# Patient Record
Sex: MALE | Race: WHITE | Employment: FULL TIME | URBAN - METROPOLITAN AREA
[De-identification: names, ages, dates, MRNs, and addresses within clinical notes are randomized per-mention and may not be internally consistent; named-entity substitution may affect disease eponyms.]

---

## 2019-08-23 RX ORDER — LOSARTAN POTASSIUM 25 MG/1
TABLET ORAL
Refills: 8 | COMMUNITY
Start: 2019-07-30 | End: 2019-08-23 | Stop reason: SDUPTHER

## 2019-08-26 RX ORDER — LOSARTAN POTASSIUM 25 MG/1
TABLET ORAL
Qty: 90 TABLET | Refills: 3 | Status: SHIPPED | OUTPATIENT
Start: 2019-08-26 | End: 2019-08-31 | Stop reason: SDUPTHER

## 2019-08-31 ENCOUNTER — OFFICE VISIT (OUTPATIENT)
Dept: PRIMARY CARE CLINIC | Age: 49
End: 2019-08-31
Payer: COMMERCIAL

## 2019-08-31 VITALS
BODY MASS INDEX: 33.18 KG/M2 | TEMPERATURE: 98.9 F | WEIGHT: 237 LBS | SYSTOLIC BLOOD PRESSURE: 128 MMHG | HEIGHT: 71 IN | DIASTOLIC BLOOD PRESSURE: 82 MMHG

## 2019-08-31 DIAGNOSIS — Z12.5 PROSTATE CANCER SCREENING: ICD-10-CM

## 2019-08-31 DIAGNOSIS — Z00.01 ENCOUNTER FOR ANNUAL GENERAL MEDICAL EXAMINATION WITH ABNORMAL FINDINGS IN ADULT: Primary | ICD-10-CM

## 2019-08-31 DIAGNOSIS — I10 ESSENTIAL HYPERTENSION: ICD-10-CM

## 2019-08-31 PROCEDURE — 99396 PREV VISIT EST AGE 40-64: CPT | Performed by: FAMILY MEDICINE

## 2019-08-31 RX ORDER — LOSARTAN POTASSIUM 25 MG/1
TABLET ORAL
Qty: 90 TABLET | Refills: 3 | Status: SHIPPED
Start: 2019-08-31 | End: 2020-08-07 | Stop reason: SDUPTHER

## 2019-08-31 ASSESSMENT — ENCOUNTER SYMPTOMS
RESPIRATORY NEGATIVE: 1
ALLERGIC/IMMUNOLOGIC NEGATIVE: 1
GASTROINTESTINAL NEGATIVE: 1
EYES NEGATIVE: 1

## 2020-07-24 ENCOUNTER — HOSPITAL ENCOUNTER (OUTPATIENT)
Age: 50
Discharge: HOME | End: 2020-07-26
Payer: COMMERCIAL

## 2020-07-24 LAB
ALBUMIN SERPL-MCNC: 4.5 G/DL (ref 3.5–5.2)
ALP BLD-CCNC: 81 U/L (ref 40–129)
ALT SERPL-CCNC: 19 U/L (ref 0–40)
ANION GAP SERPL CALCULATED.3IONS-SCNC: 12 MMOL/L (ref 7–16)
AST SERPL-CCNC: 18 U/L (ref 0–39)
BASOPHILS ABSOLUTE: 0.02 E9/L (ref 0–0.2)
BASOPHILS RELATIVE PERCENT: 0.4 % (ref 0–2)
BILIRUB SERPL-MCNC: 0.7 MG/DL (ref 0–1.2)
BUN BLDV-MCNC: 14 MG/DL (ref 6–20)
CALCIUM SERPL-MCNC: 9.2 MG/DL (ref 8.6–10.2)
CHLORIDE BLD-SCNC: 102 MMOL/L (ref 98–107)
CHOLESTEROL, TOTAL: 204 MG/DL (ref 0–199)
CO2: 26 MMOL/L (ref 22–29)
CREAT SERPL-MCNC: 1.2 MG/DL (ref 0.7–1.2)
EOSINOPHILS ABSOLUTE: 0 E9/L (ref 0.05–0.5)
EOSINOPHILS RELATIVE PERCENT: 0 % (ref 0–6)
GFR AFRICAN AMERICAN: >60
GFR NON-AFRICAN AMERICAN: >60 ML/MIN/1.73
GLUCOSE BLD-MCNC: 106 MG/DL (ref 74–99)
HCT VFR BLD CALC: 42.6 % (ref 37–54)
HDLC SERPL-MCNC: 39 MG/DL
HEMOGLOBIN: 14.4 G/DL (ref 12.5–16.5)
IMMATURE GRANULOCYTES #: 0.01 E9/L
IMMATURE GRANULOCYTES %: 0.2 % (ref 0–5)
LDL CHOLESTEROL CALCULATED: 143 MG/DL (ref 0–99)
LYMPHOCYTES ABSOLUTE: 1.79 E9/L (ref 1.5–4)
LYMPHOCYTES RELATIVE PERCENT: 35 % (ref 20–42)
MCH RBC QN AUTO: 30.8 PG (ref 26–35)
MCHC RBC AUTO-ENTMCNC: 33.8 % (ref 32–34.5)
MCV RBC AUTO: 91.2 FL (ref 80–99.9)
MONOCYTES ABSOLUTE: 0.53 E9/L (ref 0.1–0.95)
MONOCYTES RELATIVE PERCENT: 10.4 % (ref 2–12)
NEUTROPHILS ABSOLUTE: 2.77 E9/L (ref 1.8–7.3)
NEUTROPHILS RELATIVE PERCENT: 54 % (ref 43–80)
PDW BLD-RTO: 12.9 FL (ref 11.5–15)
PLATELET # BLD: 192 E9/L (ref 130–450)
PMV BLD AUTO: 9.9 FL (ref 7–12)
POTASSIUM SERPL-SCNC: 3.9 MMOL/L (ref 3.5–5)
PROSTATE SPECIFIC ANTIGEN: 1.45 NG/ML (ref 0–4)
RBC # BLD: 4.67 E12/L (ref 3.8–5.8)
SODIUM BLD-SCNC: 140 MMOL/L (ref 132–146)
TOTAL PROTEIN: 6.7 G/DL (ref 6.4–8.3)
TRIGL SERPL-MCNC: 108 MG/DL (ref 0–149)
VLDLC SERPL CALC-MCNC: 22 MG/DL
WBC # BLD: 5.1 E9/L (ref 4.5–11.5)

## 2020-07-24 PROCEDURE — 36415 COLL VENOUS BLD VENIPUNCTURE: CPT

## 2020-07-24 PROCEDURE — G0103 PSA SCREENING: HCPCS

## 2020-07-24 PROCEDURE — 85025 COMPLETE CBC W/AUTO DIFF WBC: CPT

## 2020-07-24 PROCEDURE — 80061 LIPID PANEL: CPT

## 2020-07-24 PROCEDURE — 80053 COMPREHEN METABOLIC PANEL: CPT

## 2020-08-07 ENCOUNTER — OFFICE VISIT (OUTPATIENT)
Dept: PRIMARY CARE CLINIC | Age: 50
End: 2020-08-07
Payer: COMMERCIAL

## 2020-08-07 VITALS
TEMPERATURE: 97.7 F | SYSTOLIC BLOOD PRESSURE: 128 MMHG | BODY MASS INDEX: 34.52 KG/M2 | DIASTOLIC BLOOD PRESSURE: 82 MMHG | WEIGHT: 244 LBS

## 2020-08-07 PROBLEM — I10 ESSENTIAL HYPERTENSION: Status: ACTIVE | Noted: 2020-08-07

## 2020-08-07 PROBLEM — I10 ESSENTIAL HYPERTENSION: Chronic | Status: ACTIVE | Noted: 2020-08-07

## 2020-08-07 PROBLEM — E78.2 MIXED HYPERLIPIDEMIA: Chronic | Status: ACTIVE | Noted: 2020-08-07

## 2020-08-07 PROBLEM — E78.2 MIXED HYPERLIPIDEMIA: Status: ACTIVE | Noted: 2020-08-07

## 2020-08-07 PROCEDURE — 99396 PREV VISIT EST AGE 40-64: CPT | Performed by: FAMILY MEDICINE

## 2020-08-07 RX ORDER — LOSARTAN POTASSIUM 25 MG/1
TABLET ORAL
Qty: 90 TABLET | Refills: 3 | Status: SHIPPED
Start: 2020-08-07 | End: 2020-09-09 | Stop reason: SDUPTHER

## 2020-08-07 ASSESSMENT — ENCOUNTER SYMPTOMS
GASTROINTESTINAL NEGATIVE: 1
RESPIRATORY NEGATIVE: 1
EYES NEGATIVE: 1
ALLERGIC/IMMUNOLOGIC NEGATIVE: 1

## 2020-08-07 ASSESSMENT — PATIENT HEALTH QUESTIONNAIRE - PHQ9
1. LITTLE INTEREST OR PLEASURE IN DOING THINGS: 0
2. FEELING DOWN, DEPRESSED OR HOPELESS: 0
SUM OF ALL RESPONSES TO PHQ9 QUESTIONS 1 & 2: 0
SUM OF ALL RESPONSES TO PHQ QUESTIONS 1-9: 0
SUM OF ALL RESPONSES TO PHQ QUESTIONS 1-9: 0

## 2020-08-07 NOTE — PROGRESS NOTES
20  Name: Patsy Travis    : 1970    Sex: male    Age: 47 y.o. Subjective:  Chief Complaint: Patient is here for yearly wellness  Visit and bp and alb     Feel ok   No cp or sob  Lab ntoed     Ros neg  Feels  Ok   No sx  Wt up7  Lbs  Chol   207---204    Ps   1-1-------      Review of Systems   Constitutional: Negative. HENT: Negative. Eyes: Negative. Respiratory: Negative. Cardiovascular: Negative. Gastrointestinal: Negative. Endocrine: Negative. Genitourinary: Negative. Musculoskeletal: Negative. Skin: Negative. Allergic/Immunologic: Negative. Neurological: Negative. Hematological: Negative. Psychiatric/Behavioral: Negative.           Current Outpatient Medications:     losartan (COZAAR) 25 MG tablet, TK 1 T PO QAM FOR BP, Disp: 90 tablet, Rfl: 3  Allergies   Allergen Reactions    Pcn [Penicillins] Anaphylaxis    Sulfa Antibiotics Anaphylaxis     Social History     Socioeconomic History    Marital status:      Spouse name: Not on file    Number of children: Not on file    Years of education: Not on file    Highest education level: Not on file   Occupational History    Not on file   Social Needs    Financial resource strain: Not on file    Food insecurity     Worry: Not on file     Inability: Not on file    Transportation needs     Medical: Not on file     Non-medical: Not on file   Tobacco Use    Smoking status: Never Smoker    Smokeless tobacco: Never Used   Substance and Sexual Activity    Alcohol use: No    Drug use: No    Sexual activity: Not on file   Lifestyle    Physical activity     Days per week: Not on file     Minutes per session: Not on file    Stress: Not on file   Relationships    Social connections     Talks on phone: Not on file     Gets together: Not on file     Attends Jew service: Not on file     Active member of club or organization: Not on file     Attends meetings of clubs or organizations: Not on file     Relationship status: Not on file    Intimate partner violence     Fear of current or ex partner: Not on file     Emotionally abused: Not on file     Physically abused: Not on file     Forced sexual activity: Not on file   Other Topics Concern    Not on file   Social History Narrative        SERUM SICKNESS DUE TO SULFA ALLERGIC REACTION---2004    THROMBOCYTOPENIA    ELEV BS    URETERAL CALCULI    L THUMB Jayce NO--2---THIS 11 YRS--ONE DAUGHTER WITH HER    ONE DAUGHTER LIVES IN [place] WITH PATIENTS FIRST WIFE    One daughter lives here    ED    WIFE LPN NURSING HOME---THEN HHC---peds  [employer]    HTN 7-13    ANX WITH INTERCOURSE    WORKS FOR [employer]---    LEFT URETERAL CALCULI 9-17 OR DR MY RUCKER---TOLD TO DRINK LEMONAIDE ADN    INC FLUIDS--TO SEE UROL 0CT 2020    IRBBB 9-17    BILAT ING AND UM HERNIA SEEN ON CT 9-17    . Past Medical History:   Diagnosis Date    Anxiety     Erectile dysfunction     Hyperlipidemia     Hypertension     IFG (impaired fasting glucose)     Incomplete right bundle branch block     Inguinal hernia     Serum sickness     Thrombocytopenia (HCC)     Torn tendon     LEFT THUMB    Umbilical hernia     Ureteral calculi      No family history on file. Past Surgical History:   Procedure Laterality Date    LITHOTRIPSY Left 09/28/2017    CYSTOSCOPY;LEFT STENT    TONSILLECTOMY        Vitals:    08/07/20 0702   BP: 128/82   Temp: 97.7 °F (36.5 °C)   Weight: 244 lb (110.7 kg)       Objective:    Physical Exam  Vitals signs reviewed. Constitutional:       Appearance: He is well-developed. HENT:      Head: Normocephalic. Eyes:      Pupils: Pupils are equal, round, and reactive to light. Neck:      Musculoskeletal: Normal range of motion. Cardiovascular:      Rate and Rhythm: Normal rate and regular rhythm. Pulmonary:      Effort: Pulmonary effort is normal.      Breath sounds: Normal breath sounds.

## 2020-09-09 RX ORDER — LOSARTAN POTASSIUM 25 MG/1
TABLET ORAL
Qty: 90 TABLET | Refills: 3 | Status: SHIPPED
Start: 2020-09-09 | End: 2021-07-26 | Stop reason: SDUPTHER

## 2021-03-26 ENCOUNTER — IMMUNIZATION (OUTPATIENT)
Dept: PRIMARY CARE CLINIC | Age: 51
End: 2021-03-26
Payer: COMMERCIAL

## 2021-03-26 PROCEDURE — 91301 COVID-19, MODERNA VACCINE 100MCG/0.5ML DOSE: CPT | Performed by: PHYSICIAN ASSISTANT

## 2021-03-26 PROCEDURE — 0011A PR IMM ADMN SARSCOV2 100 MCG/0.5 ML 1ST DOSE: CPT | Performed by: PHYSICIAN ASSISTANT

## 2021-04-20 ENCOUNTER — PREP FOR PROCEDURE (OUTPATIENT)
Dept: UROLOGY | Age: 51
End: 2021-04-20

## 2021-04-20 DIAGNOSIS — N20.0 RENAL STONE: Primary | ICD-10-CM

## 2021-04-20 RX ORDER — SODIUM CHLORIDE 9 MG/ML
INJECTION, SOLUTION INTRAVENOUS CONTINUOUS
Status: CANCELLED | OUTPATIENT
Start: 2021-04-20

## 2021-04-20 RX ORDER — SODIUM CHLORIDE 0.9 % (FLUSH) 0.9 %
5-40 SYRINGE (ML) INJECTION EVERY 12 HOURS SCHEDULED
Status: CANCELLED | OUTPATIENT
Start: 2021-04-20

## 2021-04-20 RX ORDER — CIPROFLOXACIN 2 MG/ML
400 INJECTION, SOLUTION INTRAVENOUS
Status: CANCELLED | OUTPATIENT
Start: 2021-04-20 | End: 2021-04-20

## 2021-04-20 RX ORDER — SODIUM CHLORIDE 0.9 % (FLUSH) 0.9 %
5-40 SYRINGE (ML) INJECTION PRN
Status: CANCELLED | OUTPATIENT
Start: 2021-04-20

## 2021-04-20 RX ORDER — SODIUM CHLORIDE 9 MG/ML
25 INJECTION, SOLUTION INTRAVENOUS PRN
Status: CANCELLED | OUTPATIENT
Start: 2021-04-20

## 2021-04-21 ENCOUNTER — HOSPITAL ENCOUNTER (OUTPATIENT)
Age: 51
Discharge: HOME | End: 2021-04-23
Payer: COMMERCIAL

## 2021-04-21 DIAGNOSIS — Z01.818 PREOP TESTING: ICD-10-CM

## 2021-04-21 PROCEDURE — U0003 INFECTIOUS AGENT DETECTION BY NUCLEIC ACID (DNA OR RNA); SEVERE ACUTE RESPIRATORY SYNDROME CORONAVIRUS 2 (SARS-COV-2) (CORONAVIRUS DISEASE [COVID-19]), AMPLIFIED PROBE TECHNIQUE, MAKING USE OF HIGH THROUGHPUT TECHNOLOGIES AS DESCRIBED BY CMS-2020-01-R: HCPCS

## 2021-04-22 LAB
SARS-COV-2: NOT DETECTED
SOURCE: NORMAL

## 2021-04-23 ENCOUNTER — IMMUNIZATION (OUTPATIENT)
Dept: PRIMARY CARE CLINIC | Age: 51
End: 2021-04-23
Payer: COMMERCIAL

## 2021-04-23 ENCOUNTER — HOSPITAL ENCOUNTER (OUTPATIENT)
Dept: PREADMISSION TESTING | Age: 51
Discharge: HOME | End: 2021-04-23
Payer: COMMERCIAL

## 2021-04-23 DIAGNOSIS — N20.0 RENAL STONE: ICD-10-CM

## 2021-04-23 LAB
ANION GAP SERPL CALCULATED.3IONS-SCNC: 8 MMOL/L (ref 7–16)
BUN BLDV-MCNC: 14 MG/DL (ref 6–20)
CALCIUM SERPL-MCNC: 9.2 MG/DL (ref 8.6–10.2)
CHLORIDE BLD-SCNC: 103 MMOL/L (ref 98–107)
CO2: 29 MMOL/L (ref 22–29)
CREAT SERPL-MCNC: 1.1 MG/DL (ref 0.7–1.2)
EKG ATRIAL RATE: 69 BPM
EKG P AXIS: 7 DEGREES
EKG P-R INTERVAL: 130 MS
EKG Q-T INTERVAL: 404 MS
EKG QRS DURATION: 108 MS
EKG QTC CALCULATION (BAZETT): 432 MS
EKG R AXIS: 47 DEGREES
EKG T AXIS: 24 DEGREES
EKG VENTRICULAR RATE: 69 BPM
GFR AFRICAN AMERICAN: >60
GFR NON-AFRICAN AMERICAN: >60 ML/MIN/1.73
GLUCOSE BLD-MCNC: 108 MG/DL (ref 74–99)
HCT VFR BLD CALC: 42.1 % (ref 37–54)
HEMOGLOBIN: 14 G/DL (ref 12.5–16.5)
MCH RBC QN AUTO: 30.1 PG (ref 26–35)
MCHC RBC AUTO-ENTMCNC: 33.3 % (ref 32–34.5)
MCV RBC AUTO: 90.5 FL (ref 80–99.9)
PDW BLD-RTO: 12.3 FL (ref 11.5–15)
PLATELET # BLD: 209 E9/L (ref 130–450)
PMV BLD AUTO: 9.3 FL (ref 7–12)
POTASSIUM SERPL-SCNC: 4 MMOL/L (ref 3.5–5)
RBC # BLD: 4.65 E12/L (ref 3.8–5.8)
SODIUM BLD-SCNC: 140 MMOL/L (ref 132–146)
WBC # BLD: 5.3 E9/L (ref 4.5–11.5)

## 2021-04-23 PROCEDURE — 85027 COMPLETE CBC AUTOMATED: CPT

## 2021-04-23 PROCEDURE — 80048 BASIC METABOLIC PNL TOTAL CA: CPT

## 2021-04-23 PROCEDURE — 93005 ELECTROCARDIOGRAM TRACING: CPT | Performed by: NURSE PRACTITIONER

## 2021-04-23 PROCEDURE — 36415 COLL VENOUS BLD VENIPUNCTURE: CPT

## 2021-04-23 PROCEDURE — 91301 COVID-19, MODERNA VACCINE 100MCG/0.5ML DOSE: CPT | Performed by: PHYSICIAN ASSISTANT

## 2021-04-23 PROCEDURE — 0012A COVID-19, MODERNA VACCINE 100MCG/0.5ML DOSE: CPT | Performed by: PHYSICIAN ASSISTANT

## 2021-04-26 ENCOUNTER — HOSPITAL ENCOUNTER (OUTPATIENT)
Age: 51
Setting detail: OUTPATIENT SURGERY
Discharge: HOME | End: 2021-04-26
Attending: UROLOGY | Admitting: UROLOGY
Payer: COMMERCIAL

## 2021-04-26 ENCOUNTER — ANESTHESIA EVENT (OUTPATIENT)
Dept: OPERATING ROOM | Age: 51
End: 2021-04-26
Payer: COMMERCIAL

## 2021-04-26 ENCOUNTER — ANESTHESIA (OUTPATIENT)
Dept: OPERATING ROOM | Age: 51
End: 2021-04-26
Payer: COMMERCIAL

## 2021-04-26 VITALS
HEART RATE: 81 BPM | SYSTOLIC BLOOD PRESSURE: 151 MMHG | OXYGEN SATURATION: 97 % | RESPIRATION RATE: 20 BRPM | WEIGHT: 230 LBS | DIASTOLIC BLOOD PRESSURE: 91 MMHG | HEIGHT: 70 IN | TEMPERATURE: 96.5 F | BODY MASS INDEX: 32.93 KG/M2

## 2021-04-26 VITALS — OXYGEN SATURATION: 100 % | SYSTOLIC BLOOD PRESSURE: 93 MMHG | DIASTOLIC BLOOD PRESSURE: 50 MMHG | TEMPERATURE: 96.4 F

## 2021-04-26 DIAGNOSIS — G89.18 POST-OPERATIVE PAIN: ICD-10-CM

## 2021-04-26 DIAGNOSIS — Z01.818 PREOP TESTING: Primary | ICD-10-CM

## 2021-04-26 PROCEDURE — 7100000010 HC PHASE II RECOVERY - FIRST 15 MIN: Performed by: UROLOGY

## 2021-04-26 PROCEDURE — 3600000003 HC SURGERY LEVEL 3 BASE: Performed by: UROLOGY

## 2021-04-26 PROCEDURE — 6360000002 HC RX W HCPCS: Performed by: NURSE PRACTITIONER

## 2021-04-26 PROCEDURE — 3600000013 HC SURGERY LEVEL 3 ADDTL 15MIN: Performed by: UROLOGY

## 2021-04-26 PROCEDURE — C1758 CATHETER, URETERAL: HCPCS | Performed by: UROLOGY

## 2021-04-26 PROCEDURE — 7100000011 HC PHASE II RECOVERY - ADDTL 15 MIN: Performed by: UROLOGY

## 2021-04-26 PROCEDURE — 6360000002 HC RX W HCPCS: Performed by: NURSE ANESTHETIST, CERTIFIED REGISTERED

## 2021-04-26 PROCEDURE — 6360000004 HC RX CONTRAST MEDICATION: Performed by: UROLOGY

## 2021-04-26 PROCEDURE — 2709999900 HC NON-CHARGEABLE SUPPLY: Performed by: UROLOGY

## 2021-04-26 PROCEDURE — 2580000003 HC RX 258: Performed by: NURSE PRACTITIONER

## 2021-04-26 PROCEDURE — 3700000000 HC ANESTHESIA ATTENDED CARE: Performed by: UROLOGY

## 2021-04-26 PROCEDURE — 2500000003 HC RX 250 WO HCPCS: Performed by: NURSE ANESTHETIST, CERTIFIED REGISTERED

## 2021-04-26 PROCEDURE — 6370000000 HC RX 637 (ALT 250 FOR IP): Performed by: ANESTHESIOLOGY

## 2021-04-26 PROCEDURE — 7100000001 HC PACU RECOVERY - ADDTL 15 MIN: Performed by: UROLOGY

## 2021-04-26 PROCEDURE — 7100000000 HC PACU RECOVERY - FIRST 15 MIN: Performed by: UROLOGY

## 2021-04-26 PROCEDURE — C2617 STENT, NON-COR, TEM W/O DEL: HCPCS | Performed by: UROLOGY

## 2021-04-26 PROCEDURE — C1769 GUIDE WIRE: HCPCS | Performed by: UROLOGY

## 2021-04-26 PROCEDURE — 3700000001 HC ADD 15 MINUTES (ANESTHESIA): Performed by: UROLOGY

## 2021-04-26 DEVICE — URETERAL STENT
Type: IMPLANTABLE DEVICE | Site: URETER | Status: FUNCTIONAL
Brand: PERCUFLEX™

## 2021-04-26 RX ORDER — ONDANSETRON 2 MG/ML
INJECTION INTRAMUSCULAR; INTRAVENOUS PRN
Status: DISCONTINUED | OUTPATIENT
Start: 2021-04-26 | End: 2021-04-26 | Stop reason: SDUPTHER

## 2021-04-26 RX ORDER — MIDAZOLAM HYDROCHLORIDE 1 MG/ML
INJECTION INTRAMUSCULAR; INTRAVENOUS PRN
Status: DISCONTINUED | OUTPATIENT
Start: 2021-04-26 | End: 2021-04-26 | Stop reason: SDUPTHER

## 2021-04-26 RX ORDER — ACETAMINOPHEN AND CODEINE PHOSPHATE 300; 30 MG/1; MG/1
1-2 TABLET ORAL EVERY 6 HOURS PRN
Qty: 10 TABLET | Refills: 0 | Status: SHIPPED | OUTPATIENT
Start: 2021-04-26 | End: 2021-05-03

## 2021-04-26 RX ORDER — PROMETHAZINE HYDROCHLORIDE 25 MG/ML
6.25 INJECTION, SOLUTION INTRAMUSCULAR; INTRAVENOUS
Status: DISCONTINUED | OUTPATIENT
Start: 2021-04-26 | End: 2021-04-26 | Stop reason: HOSPADM

## 2021-04-26 RX ORDER — PROPOFOL 10 MG/ML
INJECTION, EMULSION INTRAVENOUS PRN
Status: DISCONTINUED | OUTPATIENT
Start: 2021-04-26 | End: 2021-04-26 | Stop reason: SDUPTHER

## 2021-04-26 RX ORDER — LIDOCAINE HYDROCHLORIDE 20 MG/ML
INJECTION, SOLUTION EPIDURAL; INFILTRATION; INTRACAUDAL; PERINEURAL PRN
Status: DISCONTINUED | OUTPATIENT
Start: 2021-04-26 | End: 2021-04-26

## 2021-04-26 RX ORDER — FENTANYL CITRATE 50 UG/ML
INJECTION, SOLUTION INTRAMUSCULAR; INTRAVENOUS PRN
Status: DISCONTINUED | OUTPATIENT
Start: 2021-04-26 | End: 2021-04-26

## 2021-04-26 RX ORDER — SODIUM CHLORIDE 9 MG/ML
INJECTION, SOLUTION INTRAVENOUS CONTINUOUS
Status: DISCONTINUED | OUTPATIENT
Start: 2021-04-26 | End: 2021-04-26 | Stop reason: HOSPADM

## 2021-04-26 RX ORDER — DEXAMETHASONE SODIUM PHOSPHATE 4 MG/ML
INJECTION, SOLUTION INTRA-ARTICULAR; INTRALESIONAL; INTRAMUSCULAR; INTRAVENOUS; SOFT TISSUE PRN
Status: DISCONTINUED | OUTPATIENT
Start: 2021-04-26 | End: 2021-04-26 | Stop reason: SDUPTHER

## 2021-04-26 RX ORDER — CIPROFLOXACIN 2 MG/ML
400 INJECTION, SOLUTION INTRAVENOUS
Status: COMPLETED | OUTPATIENT
Start: 2021-04-26 | End: 2021-04-26

## 2021-04-26 RX ORDER — SODIUM CHLORIDE 0.9 % (FLUSH) 0.9 %
5-40 SYRINGE (ML) INJECTION PRN
Status: DISCONTINUED | OUTPATIENT
Start: 2021-04-26 | End: 2021-04-26 | Stop reason: HOSPADM

## 2021-04-26 RX ORDER — PROPOFOL 10 MG/ML
INJECTION, EMULSION INTRAVENOUS PRN
Status: DISCONTINUED | OUTPATIENT
Start: 2021-04-26 | End: 2021-04-26

## 2021-04-26 RX ORDER — LIDOCAINE HYDROCHLORIDE 20 MG/ML
INJECTION, SOLUTION EPIDURAL; INFILTRATION; INTRACAUDAL; PERINEURAL PRN
Status: DISCONTINUED | OUTPATIENT
Start: 2021-04-26 | End: 2021-04-26 | Stop reason: SDUPTHER

## 2021-04-26 RX ORDER — SODIUM CHLORIDE 9 MG/ML
25 INJECTION, SOLUTION INTRAVENOUS PRN
Status: DISCONTINUED | OUTPATIENT
Start: 2021-04-26 | End: 2021-04-26 | Stop reason: HOSPADM

## 2021-04-26 RX ORDER — HYDROCODONE BITARTRATE AND ACETAMINOPHEN 5; 325 MG/1; MG/1
1 TABLET ORAL ONCE
Status: COMPLETED | OUTPATIENT
Start: 2021-04-26 | End: 2021-04-26

## 2021-04-26 RX ORDER — SODIUM CHLORIDE 0.9 % (FLUSH) 0.9 %
5-40 SYRINGE (ML) INJECTION EVERY 12 HOURS SCHEDULED
Status: DISCONTINUED | OUTPATIENT
Start: 2021-04-26 | End: 2021-04-26 | Stop reason: HOSPADM

## 2021-04-26 RX ORDER — DEXAMETHASONE SODIUM PHOSPHATE 4 MG/ML
INJECTION, SOLUTION INTRA-ARTICULAR; INTRALESIONAL; INTRAMUSCULAR; INTRAVENOUS; SOFT TISSUE PRN
Status: DISCONTINUED | OUTPATIENT
Start: 2021-04-26 | End: 2021-04-26

## 2021-04-26 RX ORDER — FENTANYL CITRATE 50 UG/ML
INJECTION, SOLUTION INTRAMUSCULAR; INTRAVENOUS PRN
Status: DISCONTINUED | OUTPATIENT
Start: 2021-04-26 | End: 2021-04-26 | Stop reason: SDUPTHER

## 2021-04-26 RX ADMIN — SODIUM CHLORIDE: 9 INJECTION, SOLUTION INTRAVENOUS at 08:32

## 2021-04-26 RX ADMIN — FENTANYL CITRATE 50 MCG: 50 INJECTION, SOLUTION INTRAMUSCULAR; INTRAVENOUS at 08:43

## 2021-04-26 RX ADMIN — FENTANYL CITRATE 50 MCG: 50 INJECTION, SOLUTION INTRAMUSCULAR; INTRAVENOUS at 08:47

## 2021-04-26 RX ADMIN — ONDANSETRON 4 MG: 2 INJECTION INTRAMUSCULAR; INTRAVENOUS at 08:45

## 2021-04-26 RX ADMIN — PROPOFOL 200 MG: 10 INJECTION, EMULSION INTRAVENOUS at 08:43

## 2021-04-26 RX ADMIN — DEXAMETHASONE SODIUM PHOSPHATE 4 MG: 4 INJECTION, SOLUTION INTRAMUSCULAR; INTRAVENOUS at 08:46

## 2021-04-26 RX ADMIN — LIDOCAINE HYDROCHLORIDE 100 MG: 20 INJECTION, SOLUTION EPIDURAL; INFILTRATION; INTRACAUDAL; PERINEURAL at 08:43

## 2021-04-26 RX ADMIN — HYDROCODONE BITARTRATE AND ACETAMINOPHEN 1 TABLET: 5; 325 TABLET ORAL at 10:37

## 2021-04-26 RX ADMIN — CIPROFLOXACIN 400 MG: 2 INJECTION, SOLUTION INTRAVENOUS at 07:10

## 2021-04-26 RX ADMIN — MIDAZOLAM 2 MG: 1 INJECTION INTRAMUSCULAR; INTRAVENOUS at 08:33

## 2021-04-26 ASSESSMENT — PAIN SCALES - GENERAL
PAINLEVEL_OUTOF10: 7
PAINLEVEL_OUTOF10: 0

## 2021-04-26 ASSESSMENT — PULMONARY FUNCTION TESTS
PIF_VALUE: 4
PIF_VALUE: 1
PIF_VALUE: 4
PIF_VALUE: 4
PIF_VALUE: 5
PIF_VALUE: 1
PIF_VALUE: 4
PIF_VALUE: 4
PIF_VALUE: 5
PIF_VALUE: 4
PIF_VALUE: 3
PIF_VALUE: 35
PIF_VALUE: 4
PIF_VALUE: 6
PIF_VALUE: 4
PIF_VALUE: 17
PIF_VALUE: 5
PIF_VALUE: 4
PIF_VALUE: 3
PIF_VALUE: 2
PIF_VALUE: 4
PIF_VALUE: 3
PIF_VALUE: 2
PIF_VALUE: 4
PIF_VALUE: 4

## 2021-04-26 NOTE — ANESTHESIA PRE PROCEDURE
Department of Anesthesiology  Preprocedure Note       Name:  Nena Mccarty   Age:  45 y.o.  :  1970                                          MRN:  [id number]         Date:  2021      Surgeon: Babak Fregoso):  Garland Aleman MD    Procedure: Procedure(s):  CYSTOSCOPY LEFT ESWL EXTRACORPOREAL SHOCK WAVE LITHOTRIPSY LEFT STENT    Medications prior to admission:   Prior to Admission medications    Medication Sig Start Date End Date Taking? Authorizing Provider   losartan (COZAAR) 25 MG tablet TK 1 T PO QAM FOR BP 20  Yes Ajay Valdovinos DO       Current medications:    Current Facility-Administered Medications   Medication Dose Route Frequency Provider Last Rate Last Admin    0.9 % sodium chloride infusion   Intravenous Continuous TRINO Morales CNP        0.9 % sodium chloride infusion  25 mL Intravenous PRN TRINO Morales CNP        ciprofloxacin (CIPRO) IVPB 400 mg  400 mg Intravenous On Call to [address] - CNP        sodium chloride flush 0.9 % injection 5-40 mL  5-40 mL Intravenous 2 times per day TRINO Morales - CNP        sodium chloride flush 0.9 % injection 5-40 mL  5-40 mL Intravenous PRN TRINO Morales CNP           Allergies:     Allergies   Allergen Reactions    Pcn [Penicillins] Anaphylaxis    Sulfa Antibiotics Anaphylaxis       Problem List:    Patient Active Problem List   Diagnosis Code    Essential hypertension I10    Mixed hyperlipidemia E78.2       Past Medical History:        Diagnosis Date    Erectile dysfunction     Hyperlipidemia     diet controlled    Hypertension     Incomplete right bundle branch block     Inguinal hernia     Torn tendon     LEFT THUMB    Umbilical hernia     Ureteral calculi     For OR 21       Past Surgical History:        Procedure Laterality Date    LITHOTRIPSY Left 2017    CYSTOSCOPY;LEFT STENT    TONSILLECTOMY         Social History:    Social History     Tobacco Use    Smoking status: Never Smoker    Smokeless tobacco: Never Used   Substance Use Topics    Alcohol use: Yes     Comment: rare                                Counseling given: Not Answered      Vital Signs (Current):   Vitals:    04/22/21 1459 04/26/21 0642   BP:  (!) 140/81   Pulse:  84   Resp:  16   Temp:  97.9 °F (36.6 °C)   SpO2:  96%   Weight: 230 lb (104.3 kg) 230 lb (104.3 kg)   Height: 5' 10\" (1.778 m) 5' 10\" (1.778 m)                                              BP Readings from Last 3 Encounters:   04/26/21 (!) 140/81   08/07/20 128/82   08/31/19 128/82       NPO Status: Time of last liquid consumption: 1800                        Time of last solid consumption: 1800                        Date of last liquid consumption: 04/25/21                        Date of last solid food consumption: 04/25/21    BMI:   Wt Readings from Last 3 Encounters:   04/26/21 230 lb (104.3 kg)   08/07/20 244 lb (110.7 kg)   08/31/19 237 lb (107.5 kg)     Body mass index is 33 kg/m². CBC:   Lab Results   Component Value Date    WBC 5.3 04/23/2021    RBC 4.65 04/23/2021    HGB 14.0 04/23/2021    HCT 42.1 04/23/2021    MCV 90.5 04/23/2021    RDW 12.3 04/23/2021     04/23/2021       CMP:   Lab Results   Component Value Date     04/23/2021    K 4.0 04/23/2021     04/23/2021    CO2 29 04/23/2021    BUN 14 04/23/2021    CREATININE 1.1 04/23/2021    GFRAA >60 04/23/2021    LABGLOM >60 04/23/2021    GLUCOSE 108 04/23/2021    PROT 6.7 07/24/2020    CALCIUM 9.2 04/23/2021    BILITOT 0.7 07/24/2020    ALKPHOS 81 07/24/2020    AST 18 07/24/2020    ALT 19 07/24/2020       POC Tests: No results for input(s): POCGLU, POCNA, POCK, POCCL, POCBUN, POCHEMO, POCHCT in the last 72 hours.     Coags: No results found for: PROTIME, INR, APTT    HCG (If Applicable): No results found for: PREGTESTUR, PREGSERUM, HCG, HCGQUANT     ABGs: No results found for: PHART, PO2ART, RGN2ZUH, EOD3PFD, BEART, Q7JEGFZU     Type & Screen (If Applicable):  No results found for: LABABO, LABRH    Drug/Infectious Status (If Applicable):  No results found for: HIV, HEPCAB    COVID-19 Screening (If Applicable):   Lab Results   Component Value Date    COVID19 Not Detected 04/21/2021           Anesthesia Evaluation  Patient summary reviewed and Nursing notes reviewed no history of anesthetic complications:   Airway: Mallampati: III  TM distance: >3 FB   Neck ROM: full  Mouth opening: > = 3 FB Dental: normal exam         Pulmonary:Negative Pulmonary ROS breath sounds clear to auscultation                             Cardiovascular:  Exercise tolerance: good (>4 METS),   (+) hypertension:, hyperlipidemia        Rhythm: regular  Rate: normal                    Neuro/Psych:   Negative Neuro/Psych ROS              GI/Hepatic/Renal:   (+) renal disease: kidney stones,           Endo/Other: Negative Endo/Other ROS                    Abdominal:           Vascular: negative vascular ROS. Anesthesia Plan      general     ASA 2       Induction: intravenous. MIPS: Postoperative opioids intended and Prophylactic antiemetics administered. Anesthetic plan and risks discussed with patient and spouse.       Plan discussed with CRNA and surgical team.                  Taco El MD   4/26/2021

## 2021-04-26 NOTE — H&P
Isis Esparza is a 45 y.o. male with a left renal stone. Past Medical History:   Diagnosis Date    Erectile dysfunction     Hyperlipidemia     diet controlled    Hypertension     Incomplete right bundle branch block     Inguinal hernia     Torn tendon     LEFT THUMB    Umbilical hernia     Ureteral calculi     For OR 4-26-21       Past Surgical History:   Procedure Laterality Date    LITHOTRIPSY Left 09/28/2017    CYSTOSCOPY;LEFT STENT    TONSILLECTOMY         History reviewed. No pertinent family history. Prior to Admission medications    Medication Sig Start Date End Date Taking? Authorizing Provider   losartan (COZAAR) 25 MG tablet TK 1 T PO QAM FOR BP 9/9/20  Yes Ajay Valdovinos DO        Allergies: Pcn [penicillins] and Sulfa antibiotics    Social History     Tobacco Use    Smoking status: Never Smoker    Smokeless tobacco: Never Used   Substance Use Topics    Alcohol use: Yes     Comment: rare        Review of Systems:  Respiratory: negative for cough and hemoptysis  Cardiovascular: negative for chest pain and dyspnea  Gastrointestinal: negative for abdominal pain, diarrhea, nausea and vomiting  Genitourinary: as per HPI, otherwise negative. Derm: negative for rash and skin lesion(s)  Neurological: negative for seizures and tremors  Endocrine: negative for diabetic symptoms including polydipsia and polyuria  All other systems negative    Physical Exam:  Vitals:    04/26/21 0642   BP: (!) 140/81   Pulse: 84   Resp: 16   Temp: 97.9 °F (36.6 °C)   SpO2: 96%      Skin:  Warm and dry.   No rash or bruises  HEENT:  PERRLA, EOMI  Neck:  No JVD, No thyromegaly  Cardiac:  RRR  Lungs:  No audible wheezes, symmetric respirations, non-labored  Abdomen: Soft, non-tender, non-distended  Extremities:  No clubbing, edema or cyanosis  Neurological:  Moves all extremities, normal DTR    Lab Results   Component Value Date    WBC 5.3 04/23/2021    HGB 14.0 04/23/2021    HCT 42.1 04/23/2021    MCV 90.5 04/23/2021     04/23/2021       Lab Results   Component Value Date    CREATININE 1.1 04/23/2021       Lab Results   Component Value Date    PSA 1.45 07/24/2020       No results for input(s): LABURIN in the last 72 hours. No results for input(s): BC in the last 72 hours. No results for input(s): [place] in the last 72 hours. Assessment and Plan:  1. To OR for cysto, left stent insertion, left eswl.

## 2021-04-26 NOTE — PROGRESS NOTES
CLINICAL PHARMACY NOTE: MEDS TO [address] [place] [address] Drive Select Patient?: No  Total # of Prescriptions Filled: 1   The following medications were delivered to the patient:  · Acetaminophen codeine 300-30 mg  Total # of Interventions Completed: 7  Time Spent (min): 60    Additional Documentation:
Discharge and anesthesia instructions given to patient and spouse, denies any questions
Have you been tested for COVID  Yes           Have you been told you were positive for COVID No  Have you had any known exposure to someone that is positive for COVID No  Do you have a cough                   No              Do you have shortness of breath No                 Do you have a sore throat            No                Are you having chills                    No                Are you having muscle aches. No                    Please come to the hospital wearing a mask and have your significant other wear a mask as well. Both of you should check your temperature before leaving to come here,  if it is 100 or higher please call [phone] for instruction.
would greatly appreciate your comments    [] Parent/guardian of a minor must accompany their child and remain on the premises  the entire time they are under our care     [] Pediatric patients may bring favorite toy, blanket or comfort item with them    [] A caregiver or family member must remain with the patient during their stay if they are mentally handicapped, have dementia, disoriented or unable to use a call light or would be a safety concern if left unattended    [x] Please notify surgeon if you develop any illness between now and time of surgery (cold, cough, sore throat, fever, nausea, vomiting) or any signs of infections  including skin, wounds, and dental.    [] Other instructions    EDUCATIONAL MATERIALS PROVIDED:    [] PAT Preoperative Education Packet/Booklet     [] Medication List    [] Fluoroscopy Information Pamphlet    [] Transfusion bracelet applied with instructions    [] Joint replacement video reviewed    [] Shower with antibacterial soap and use CHG wipes provided the evening before surgery as instructed

## 2021-04-26 NOTE — OP NOTE
Operative Note      Patient: Noemi Omalley  YOB: 1970  MRN: [id number]    Date of Procedure: 4/26/2021    Pre-Op Diagnosis: 1.9 cm LEFT LOWER POLE RENAL STONE    Post-Op Diagnosis: Same       Procedure(s):  CYSTOSCOPY LEFT ESWL EXTRACORPOREAL SHOCK WAVE LITHOTRIPSY LEFT STENT    Surgeon(s):  Lane Rose MD    Assistant:   * No surgical staff found *    Anesthesia: General    Estimated Blood Loss (mL): Minimal    Complications: None    Specimens:   * No specimens in log *    Implants:  Implant Name Type Inv. Item Serial No.  Lot No. LRB No. Used Action   STENT URET 6FR L28CM PERCFLX FIRM DUROMETER DBL PGTL TAPR  STENT URET 6FR L28CM PERCFLX FIRM DUROMETER DBL PGTL TAPR  [facility]- [id number] Left 1 Implanted         Drains: * No LDAs found *    INDICATION FOR PROCEDURE:    Noemi Omalley is a 45 y.o. male with a 1.9 cm stone in the lower pole of the left kidney. He was given all treatment options and risks of these options and we agreed together to treat with extracorporeal shock wave lithotripsy. We discussed this procedure in depth including the risks specific to this procedure. The patient will undergo cystoscopy, stent insertion, ESWL. We discussed perinephric hematoma, rivera strausse, the possibility of incomplete stone fragmentation requiring further procedures, the chronic effects of shock wave lithotripsy on the kidney if done in excess, as well as the general risks of any surgical procedure. Informed consent was obtained. OPERATIVE NOTE:    Noemi Omalley was brought into the operating room and placed under general anesthesia on the [facility] mobile lithotripsy table. The stone was clearly identified under flouroscopy and placed by the technician and I into the F2 region of the lithotripter. A cystoscope was introduced into the urethral and into the bladder. The prostate revealed trilobar hyperplasia.   The bladder was examined in entirety and found to

## 2021-04-26 NOTE — H&P
Danie Villasenor is a 45 y.o. male with a left renal stone. Past Medical History:   Diagnosis Date    Erectile dysfunction     Hyperlipidemia     diet controlled    Hypertension     Incomplete right bundle branch block     Inguinal hernia     Torn tendon     LEFT THUMB    Umbilical hernia     Ureteral calculi     For OR 4-26-21       Past Surgical History:   Procedure Laterality Date    LITHOTRIPSY Left 09/28/2017    CYSTOSCOPY;LEFT STENT    TONSILLECTOMY         History reviewed. No pertinent family history. Prior to Admission medications    Medication Sig Start Date End Date Taking? Authorizing Provider   losartan (COZAAR) 25 MG tablet TK 1 T PO QAM FOR BP 9/9/20  Yes Ajay Valdovinos DO        Allergies: Pcn [penicillins] and Sulfa antibiotics    Social History     Tobacco Use    Smoking status: Never Smoker    Smokeless tobacco: Never Used   Substance Use Topics    Alcohol use: Yes     Comment: rare        Review of Systems:  Respiratory: negative for cough and hemoptysis  Cardiovascular: negative for chest pain and dyspnea  Gastrointestinal: negative for abdominal pain, diarrhea, nausea and vomiting  Genitourinary: as per HPI, otherwise negative. Derm: negative for rash and skin lesion(s)  Neurological: negative for seizures and tremors  Endocrine: negative for diabetic symptoms including polydipsia and polyuria  All other systems negative    Physical Exam:  Vitals:    04/26/21 0642   BP: (!) 140/81   Pulse: 84   Resp: 16   Temp: 97.9 °F (36.6 °C)   SpO2: 96%      Skin:  Warm and dry.   No rash or bruises  HEENT:  PERRLA, EOMI  Neck:  No JVD, No thyromegaly  Cardiac:  RRR  Lungs:  No audible wheezes, symmetric respirations, non-labored  Abdomen: Soft, non-tender, non-distended  Extremities:  No clubbing, edema or cyanosis  Neurological:  Moves all extremities, normal DTR    Lab Results   Component Value Date    WBC 5.3 04/23/2021    HGB 14.0 04/23/2021    HCT 42.1 04/23/2021    MCV 90.5 04/23/2021     04/23/2021       Lab Results   Component Value Date    CREATININE 1.1 04/23/2021       Lab Results   Component Value Date    PSA 1.45 07/24/2020       No results for input(s): LABURIN in the last 72 hours. No results for input(s): BC in the last 72 hours. No results for input(s): [place] Hover in the last 72 hours. Assessment and Plan:  1. LEFT 11 mm STONE S/P LEFT ESWL 2017. PASSED A LARGE AMOUNT OF TINY STONES AT THE TIME. NOW WITH A 14 mm LEFT LOWER POLE STONE ( READ AS 19 mm ON KUB ). WE DISCUSSED TREATMENT OPTIONS FOR LOWER POLE STONES > 1 cm AND PCNL VS. 2 STAGED PROCEDURE. AFTER DISCUSSING EACH PROCEDURE AND THE PROS / CONS WE AGREED FOR HIM TO HAVE LEFT ESWL FOLLOWED 1 WEEK LATER WITH COMPLEX LEFT URETEROSCOPY WITH STONE REMOVAL. WE DISCUSSED THE RISKS OF BLEEDING, CABRERA-STRAUSSE AND ALL OTHER RISKS ASSOCIATED WITH THESE 2 PROCEDURE. HE AGREES TO PROCEED.     24 HOUR URINE IN THE PAST WITH DR. Fanny Allred SHOWED PROFOUND DEHYDRATION. WEAKER STREAM IN THE MORNING. NO BOTHER OTHERWISE. LIKELY DUE TO BPH UNDERLYING. PROSTATE 26 g ON ULTRASOUND 2020 AND PVR 30 mL. NO ISSUES TODAY. PROSTATE CANCER SCREENING PER DR. PENALOZA.

## 2021-05-03 ENCOUNTER — HOSPITAL ENCOUNTER (OUTPATIENT)
Age: 51
Discharge: HOME | End: 2021-05-05

## 2021-05-03 PROCEDURE — 82365 CALCULUS SPECTROSCOPY: CPT

## 2021-05-03 PROCEDURE — 88300 SURGICAL PATH GROSS: CPT

## 2021-05-07 LAB
CALCULI COMPOSITION: NORMAL
MASS: 91 MG
STONE DESCRIPTION: NORMAL
STONE NUMBER: NORMAL
STONE SIZE: NORMAL MM

## 2021-07-26 DIAGNOSIS — I10 ESSENTIAL HYPERTENSION: ICD-10-CM

## 2021-07-26 RX ORDER — LOSARTAN POTASSIUM 25 MG/1
TABLET ORAL
Qty: 90 TABLET | Refills: 3 | Status: SHIPPED
Start: 2021-07-26 | End: 2022-08-08 | Stop reason: SDUPTHER

## 2021-08-02 DIAGNOSIS — Z12.5 PROSTATE CANCER SCREENING: ICD-10-CM

## 2021-08-02 DIAGNOSIS — Z00.01 ENCOUNTER FOR ANNUAL GENERAL MEDICAL EXAMINATION WITH ABNORMAL FINDINGS IN ADULT: ICD-10-CM

## 2021-08-02 LAB
ALBUMIN SERPL-MCNC: 4.1 G/DL (ref 3.5–5.2)
ALP BLD-CCNC: 96 U/L (ref 40–129)
ALT SERPL-CCNC: 18 U/L (ref 0–40)
ANION GAP SERPL CALCULATED.3IONS-SCNC: 12 MMOL/L (ref 7–16)
AST SERPL-CCNC: 16 U/L (ref 0–39)
BASOPHILS ABSOLUTE: 0.02 E9/L (ref 0–0.2)
BASOPHILS RELATIVE PERCENT: 0.4 % (ref 0–2)
BILIRUB SERPL-MCNC: 0.3 MG/DL (ref 0–1.2)
BUN BLDV-MCNC: 14 MG/DL (ref 6–20)
CALCIUM SERPL-MCNC: 9.3 MG/DL (ref 8.6–10.2)
CHLORIDE BLD-SCNC: 105 MMOL/L (ref 98–107)
CHOLESTEROL, TOTAL: 195 MG/DL (ref 0–199)
CO2: 25 MMOL/L (ref 22–29)
CREAT SERPL-MCNC: 1.2 MG/DL (ref 0.7–1.2)
EOSINOPHILS ABSOLUTE: 0 E9/L (ref 0.05–0.5)
EOSINOPHILS RELATIVE PERCENT: 0 % (ref 0–6)
GFR AFRICAN AMERICAN: >60
GFR NON-AFRICAN AMERICAN: >60 ML/MIN/1.73
GLUCOSE BLD-MCNC: 112 MG/DL (ref 74–99)
HCT VFR BLD CALC: 42.5 % (ref 37–54)
HDLC SERPL-MCNC: 39 MG/DL
HEMOGLOBIN: 14.1 G/DL (ref 12.5–16.5)
IMMATURE GRANULOCYTES #: 0.01 E9/L
IMMATURE GRANULOCYTES %: 0.2 % (ref 0–5)
LDL CHOLESTEROL CALCULATED: 137 MG/DL (ref 0–99)
LYMPHOCYTES ABSOLUTE: 1.51 E9/L (ref 1.5–4)
LYMPHOCYTES RELATIVE PERCENT: 33.6 % (ref 20–42)
MCH RBC QN AUTO: 30.1 PG (ref 26–35)
MCHC RBC AUTO-ENTMCNC: 33.2 % (ref 32–34.5)
MCV RBC AUTO: 90.6 FL (ref 80–99.9)
MONOCYTES ABSOLUTE: 0.4 E9/L (ref 0.1–0.95)
MONOCYTES RELATIVE PERCENT: 8.9 % (ref 2–12)
NEUTROPHILS ABSOLUTE: 2.55 E9/L (ref 1.8–7.3)
NEUTROPHILS RELATIVE PERCENT: 56.9 % (ref 43–80)
PDW BLD-RTO: 12.9 FL (ref 11.5–15)
PLATELET # BLD: 188 E9/L (ref 130–450)
PMV BLD AUTO: 10 FL (ref 7–12)
POTASSIUM SERPL-SCNC: 4.5 MMOL/L (ref 3.5–5)
PROSTATE SPECIFIC ANTIGEN: 1.22 NG/ML (ref 0–4)
RBC # BLD: 4.69 E12/L (ref 3.8–5.8)
SODIUM BLD-SCNC: 142 MMOL/L (ref 132–146)
TOTAL PROTEIN: 6.4 G/DL (ref 6.4–8.3)
TRIGL SERPL-MCNC: 93 MG/DL (ref 0–149)
VLDLC SERPL CALC-MCNC: 19 MG/DL
WBC # BLD: 4.5 E9/L (ref 4.5–11.5)

## 2021-08-09 ENCOUNTER — OFFICE VISIT (OUTPATIENT)
Dept: PRIMARY CARE CLINIC | Age: 51
End: 2021-08-09
Payer: COMMERCIAL

## 2021-08-09 VITALS
DIASTOLIC BLOOD PRESSURE: 78 MMHG | WEIGHT: 248 LBS | BODY MASS INDEX: 35.5 KG/M2 | SYSTOLIC BLOOD PRESSURE: 128 MMHG | TEMPERATURE: 99.1 F | HEIGHT: 70 IN

## 2021-08-09 DIAGNOSIS — R73.03 PRE-DIABETES: ICD-10-CM

## 2021-08-09 DIAGNOSIS — E78.2 MIXED HYPERLIPIDEMIA: Chronic | ICD-10-CM

## 2021-08-09 DIAGNOSIS — Z12.11 SCREEN FOR COLON CANCER: ICD-10-CM

## 2021-08-09 DIAGNOSIS — Z00.01 ENCOUNTER FOR ANNUAL GENERAL MEDICAL EXAMINATION WITH ABNORMAL FINDINGS IN ADULT: Primary | ICD-10-CM

## 2021-08-09 DIAGNOSIS — I10 ESSENTIAL HYPERTENSION: Chronic | ICD-10-CM

## 2021-08-09 PROCEDURE — 99396 PREV VISIT EST AGE 40-64: CPT | Performed by: FAMILY MEDICINE

## 2021-08-09 ASSESSMENT — PATIENT HEALTH QUESTIONNAIRE - PHQ9
SUM OF ALL RESPONSES TO PHQ QUESTIONS 1-9: 0
SUM OF ALL RESPONSES TO PHQ QUESTIONS 1-9: 0
SUM OF ALL RESPONSES TO PHQ9 QUESTIONS 1 & 2: 0
SUM OF ALL RESPONSES TO PHQ QUESTIONS 1-9: 0
2. FEELING DOWN, DEPRESSED OR HOPELESS: 0
1. LITTLE INTEREST OR PLEASURE IN DOING THINGS: 0

## 2021-08-09 ASSESSMENT — ENCOUNTER SYMPTOMS
RESPIRATORY NEGATIVE: 1
GASTROINTESTINAL NEGATIVE: 1
ALLERGIC/IMMUNOLOGIC NEGATIVE: 1
EYES NEGATIVE: 1

## 2021-08-09 NOTE — PROGRESS NOTES
[place]   Name: Naga Sifuentes    : 1970    Sex: male    Age: 45 y.o. Subjective:  Chief Complaint: Patient is here for one year ck     Re  bp    Chol  lab     Feel ok  No  Cp or sob       fbs up chol ok    bp    Not ck at home  Ros neg  Pt     Declines colonospy         Review of Systems   Constitutional: Negative. HENT: Negative. Eyes: Negative. Respiratory: Negative. Cardiovascular: Negative. Gastrointestinal: Negative. Endocrine: Negative. Genitourinary: Negative. Musculoskeletal: Negative. Skin: Negative. Allergic/Immunologic: Negative. Neurological: Negative. Hematological: Negative. Psychiatric/Behavioral: Negative.           Current Outpatient Medications:     losartan (COZAAR) 25 MG tablet, TK 1 T PO QAM FOR BP, Disp: 90 tablet, Rfl: 3  Allergies   Allergen Reactions    Pcn [Penicillins] Anaphylaxis    Sulfa Antibiotics Anaphylaxis     Social History     Socioeconomic History    Marital status:      Spouse name: Not on file    Number of children: Not on file    Years of education: Not on file    Highest education level: Not on file   Occupational History    Not on file   Tobacco Use    Smoking status: Never Smoker    Smokeless tobacco: Never Used   Vaping Use    Vaping Use: Never used   Substance and Sexual Activity    Alcohol use: Yes     Comment: rare    Drug use: No    Sexual activity: Not on file   Other Topics Concern    Not on file   Social History Narrative        SERUM SICKNESS DUE TO SULFA ALLERGIC REACTION---    THROMBOCYTOPENIA    ELEV BS    URETERAL CALCULI    L THUMB TORN TENDON AND OR WITH TITI--WORK    M--2---THIS 11 YRS--ONE DAUGHTER WITH HER    ONE DAUGHTER LIVES IN [place] WITH PATIENTS FIRST WIFE    One daughter lives here    ED    WIFE LPN NURSING HOME---THEN [facility]---peds  maxim    HTN 7-13    ANX WITH INTERCOURSE    WORKS FOR [employer]----[employer]---    LEFT URETERAL CALCULI - OR DR PEPE MILEMIRIAN---TOLD TO DRINK LEMONAIDE ADN    INC FLUIDS--TO SEE UROL 0CT 2020    IRBBB 9-17    BILAT ING AND UM HERNIA SEEN ON CT 9-17    Kidney stone lithtripsy    4-21     . Social Determinants of Health     Financial Resource Strain:     Difficulty of Paying Living Expenses:    Food Insecurity:     Worried About Running Out of Food in the Last Year:     [address] in the Last Year:    Transportation Needs:     Lack of Transportation (Medical):  Lack of Transportation (Non-Medical):    Physical Activity:     Days of Exercise per Week:     Minutes of Exercise per Session:    Stress:     Feeling of Stress :    Social Connections:     Frequency of Communication with Friends and Family:     Frequency of Social Gatherings with Friends and Family:     Attends Sabianist Services:     Active Member of Clubs or Organizations:     Attends Club or Organization Meetings:     Marital Status:    Intimate Partner Violence:     Fear of Current or Ex-Partner:     Emotionally Abused:     Physically Abused:     Sexually Abused:       Past Medical History:   Diagnosis Date    Erectile dysfunction     Hyperlipidemia     diet controlled    Hypertension     Incomplete right bundle branch block     Inguinal hernia     Torn tendon     LEFT THUMB    Umbilical hernia     Ureteral calculi     For OR 4-26-21     No family history on file. Past Surgical History:   Procedure Laterality Date    LITHOTRIPSY Left 09/28/2017    CYSTOSCOPY;LEFT STENT    LITHOTRIPSY Left 4/26/2021    CYSTOSCOPY LEFT ESWL EXTRACORPOREAL SHOCK WAVE LITHOTRIPSY LEFT STENT performed by Zoe Burnette MD at [address] [place] [address]:    08/09/21 0640   BP: 128/78   Temp: 99.1 °F (37.3 °C)   TempSrc: Oral   Weight: 248 lb (112.5 kg)   Height: 5' 10\" (1.778 m)       Objective:    Physical Exam  Vitals reviewed. Constitutional:       Appearance: He is well-developed. HENT:      Head: Normocephalic.    Eyes: Pupils: Pupils are equal, round, and reactive to light. Cardiovascular:      Rate and Rhythm: Normal rate and regular rhythm. Pulmonary:      Effort: Pulmonary effort is normal.      Breath sounds: Normal breath sounds. Abdominal:      Palpations: Abdomen is soft. Musculoskeletal:         General: Normal range of motion. Cervical back: Normal range of motion. Skin:     General: Skin is warm. Neurological:      Mental Status: He is alert and oriented to person, place, and time. Psychiatric:         Behavior: Behavior normal.         Yolande Godinez was seen today for annual exam.    Diagnoses and all orders for this visit:    Encounter for annual general medical examination with abnormal findings in adult  -     CBC Auto Differential; Future  -     Comprehensive Metabolic Panel; Future  -     Hemoglobin A1C; Future  -     Lipid Panel; Future  -     PSA screening; Future  -     Urinalysis; Future    Essential hypertension    Mixed hyperlipidemia    Pre-diabetes  -     Hemoglobin A1C; Future    Screen for colon cancer  -     Cologuard (For External Results Only); Future        Comments: lose wt  Diet exer  . bp  Home    Check blood pressure at home twice a day. Low-salt low caffeine diet. Call if systolic blood pressure is above 948 and diastolic blood pressures above 85. Only use a upper arm digital cuff. Aggressive low-fat diet. Avoid red meats, greasy fried foods, dairy products. Avoid processed foods. Take cholesterol medications without food. A great deal of time spent reviewing medications, diet, exercise, social issues. Also reviewing the chart before entering the room with patient and finishing charting after leaving patient's room. More than half of that time was spent face to face with the patient in counseling and coordinating care. Follow Up: Return in about 1 year (around 8/9/2022) for Lab Before.      Seen by:  DO Consuelo

## 2021-09-23 ENCOUNTER — TELEPHONE (OUTPATIENT)
Dept: PRIMARY CARE CLINIC | Age: 51
End: 2021-09-23

## 2021-09-23 NOTE — TELEPHONE ENCOUNTER
Pt saw Dr Adri Darden for kidney stones and was given Chlorthalidone and was to call and ask you if it is ok with him to take with his other meds(BP meds)

## 2022-08-08 DIAGNOSIS — I10 ESSENTIAL HYPERTENSION: ICD-10-CM

## 2022-08-08 RX ORDER — LOSARTAN POTASSIUM 25 MG/1
TABLET ORAL
Qty: 90 TABLET | Refills: 3 | Status: SHIPPED
Start: 2022-08-08 | End: 2022-08-15 | Stop reason: SDUPTHER

## 2022-08-09 DIAGNOSIS — R73.03 PRE-DIABETES: ICD-10-CM

## 2022-08-09 DIAGNOSIS — Z00.01 ENCOUNTER FOR ANNUAL GENERAL MEDICAL EXAMINATION WITH ABNORMAL FINDINGS IN ADULT: ICD-10-CM

## 2022-08-09 LAB
ALBUMIN SERPL-MCNC: 4 G/DL (ref 3.5–5.2)
ALP BLD-CCNC: 85 U/L (ref 40–129)
ALT SERPL-CCNC: 13 U/L (ref 0–40)
ANION GAP SERPL CALCULATED.3IONS-SCNC: 13 MMOL/L (ref 7–16)
AST SERPL-CCNC: 18 U/L (ref 0–39)
BASOPHILS ABSOLUTE: 0.03 E9/L (ref 0–0.2)
BASOPHILS RELATIVE PERCENT: 0.6 % (ref 0–2)
BILIRUB SERPL-MCNC: 0.6 MG/DL (ref 0–1.2)
BUN BLDV-MCNC: 15 MG/DL (ref 6–20)
CALCIUM SERPL-MCNC: 9.2 MG/DL (ref 8.6–10.2)
CHLORIDE BLD-SCNC: 97 MMOL/L (ref 98–107)
CHOLESTEROL, TOTAL: 218 MG/DL (ref 0–199)
CO2: 30 MMOL/L (ref 22–29)
CREAT SERPL-MCNC: 1.2 MG/DL (ref 0.7–1.2)
EOSINOPHILS ABSOLUTE: 0 E9/L (ref 0.05–0.5)
EOSINOPHILS RELATIVE PERCENT: 0 % (ref 0–6)
GFR AFRICAN AMERICAN: >60
GFR NON-AFRICAN AMERICAN: >60 ML/MIN/1.73
GLUCOSE BLD-MCNC: 99 MG/DL (ref 74–99)
HBA1C MFR BLD: 5.6 % (ref 4–5.6)
HCT VFR BLD CALC: 42.9 % (ref 37–54)
HDLC SERPL-MCNC: 40 MG/DL
HEMOGLOBIN: 14.6 G/DL (ref 12.5–16.5)
IMMATURE GRANULOCYTES #: 0.02 E9/L
IMMATURE GRANULOCYTES %: 0.4 % (ref 0–5)
LDL CHOLESTEROL CALCULATED: 148 MG/DL (ref 0–99)
LYMPHOCYTES ABSOLUTE: 1.93 E9/L (ref 1.5–4)
LYMPHOCYTES RELATIVE PERCENT: 37 % (ref 20–42)
MCH RBC QN AUTO: 30.5 PG (ref 26–35)
MCHC RBC AUTO-ENTMCNC: 34 % (ref 32–34.5)
MCV RBC AUTO: 89.6 FL (ref 80–99.9)
MONOCYTES ABSOLUTE: 0.52 E9/L (ref 0.1–0.95)
MONOCYTES RELATIVE PERCENT: 10 % (ref 2–12)
NEUTROPHILS ABSOLUTE: 2.71 E9/L (ref 1.8–7.3)
NEUTROPHILS RELATIVE PERCENT: 52 % (ref 43–80)
PDW BLD-RTO: 13.1 FL (ref 11.5–15)
PLATELET # BLD: 212 E9/L (ref 130–450)
PMV BLD AUTO: 9.7 FL (ref 7–12)
POTASSIUM SERPL-SCNC: 3 MMOL/L (ref 3.5–5)
PROSTATE SPECIFIC ANTIGEN: 1.19 NG/ML (ref 0–4)
RBC # BLD: 4.79 E12/L (ref 3.8–5.8)
SODIUM BLD-SCNC: 140 MMOL/L (ref 132–146)
TOTAL PROTEIN: 6.6 G/DL (ref 6.4–8.3)
TRIGL SERPL-MCNC: 151 MG/DL (ref 0–149)
VLDLC SERPL CALC-MCNC: 30 MG/DL
WBC # BLD: 5.2 E9/L (ref 4.5–11.5)

## 2022-08-15 ENCOUNTER — OFFICE VISIT (OUTPATIENT)
Dept: PRIMARY CARE CLINIC | Age: 52
End: 2022-08-15
Payer: COMMERCIAL

## 2022-08-15 VITALS
DIASTOLIC BLOOD PRESSURE: 74 MMHG | WEIGHT: 238 LBS | TEMPERATURE: 98.5 F | SYSTOLIC BLOOD PRESSURE: 126 MMHG | BODY MASS INDEX: 34.07 KG/M2 | HEIGHT: 70 IN

## 2022-08-15 DIAGNOSIS — I10 ESSENTIAL HYPERTENSION: ICD-10-CM

## 2022-08-15 DIAGNOSIS — N20.0 KIDNEY STONE: ICD-10-CM

## 2022-08-15 DIAGNOSIS — E11.9 DIET-CONTROLLED DIABETES MELLITUS (HCC): ICD-10-CM

## 2022-08-15 DIAGNOSIS — Z00.01 ENCOUNTER FOR ANNUAL GENERAL MEDICAL EXAMINATION WITH ABNORMAL FINDINGS IN ADULT: Primary | ICD-10-CM

## 2022-08-15 DIAGNOSIS — E55.9 VITAMIN D DEFICIENCY: ICD-10-CM

## 2022-08-15 PROCEDURE — 99396 PREV VISIT EST AGE 40-64: CPT | Performed by: FAMILY MEDICINE

## 2022-08-15 RX ORDER — SILDENAFIL CITRATE 20 MG/1
TABLET ORAL
Qty: 30 TABLET | Refills: 12 | Status: SHIPPED | OUTPATIENT
Start: 2022-08-15

## 2022-08-15 RX ORDER — LOSARTAN POTASSIUM 25 MG/1
TABLET ORAL
Qty: 90 TABLET | Refills: 3 | Status: SHIPPED | OUTPATIENT
Start: 2022-08-15

## 2022-08-15 RX ORDER — POTASSIUM CHLORIDE 750 MG/1
10 TABLET, EXTENDED RELEASE ORAL DAILY
Qty: 90 TABLET | Refills: 5 | Status: SHIPPED | OUTPATIENT
Start: 2022-08-15

## 2022-08-15 RX ORDER — HYDROCHLOROTHIAZIDE 25 MG/1
25 TABLET ORAL EVERY MORNING
Qty: 90 TABLET | Refills: 1
Start: 2022-08-15

## 2022-08-15 ASSESSMENT — ENCOUNTER SYMPTOMS
GASTROINTESTINAL NEGATIVE: 1
ALLERGIC/IMMUNOLOGIC NEGATIVE: 1
EYES NEGATIVE: 1
RESPIRATORY NEGATIVE: 1

## 2022-08-15 ASSESSMENT — PATIENT HEALTH QUESTIONNAIRE - PHQ9
SUM OF ALL RESPONSES TO PHQ QUESTIONS 1-9: 0
SUM OF ALL RESPONSES TO PHQ QUESTIONS 1-9: 0
SUM OF ALL RESPONSES TO PHQ9 QUESTIONS 1 & 2: 0
SUM OF ALL RESPONSES TO PHQ QUESTIONS 1-9: 0
2. FEELING DOWN, DEPRESSED OR HOPELESS: 0
SUM OF ALL RESPONSES TO PHQ QUESTIONS 1-9: 0
1. LITTLE INTEREST OR PLEASURE IN DOING THINGS: 0

## 2022-08-15 NOTE — PROGRESS NOTES
8/15/22  Name: Julissa Nolasco    : 1970    Sex: male    Age: 45 y.o. Subjective:  Chief Complaint: Patient is here for one yerak ck bp  chol    kidney stone    potassium    erections    Feel renato  no cp ros b k down    urol gave him hctz and  affects e rections and  sees urol soon      Review of Systems   Constitutional: Negative. HENT: Negative. Eyes: Negative. Respiratory: Negative. Cardiovascular: Negative. Gastrointestinal: Negative. Endocrine: Negative. Genitourinary: Negative. Musculoskeletal: Negative. Skin: Negative. Allergic/Immunologic: Negative. Neurological: Negative. Hematological: Negative. Psychiatric/Behavioral: Negative.          Current Outpatient Medications:     sildenafil (REVATIO) 20 MG tablet, Take one to  five  daily prn, Disp: 30 tablet, Rfl: 12    hydroCHLOROthiazide (HYDRODIURIL) 25 MG tablet, Take 1 tablet by mouth every morning, Disp: 90 tablet, Rfl: 1    losartan (COZAAR) 25 MG tablet, TK 1 T PO QAM FOR BP, Disp: 90 tablet, Rfl: 3    potassium chloride (KLOR-CON M) 10 MEQ extended release tablet, Take 1 tablet by mouth in the morning., Disp: 90 tablet, Rfl: 5  Allergies   Allergen Reactions    Pcn [Penicillins] Anaphylaxis    Sulfa Antibiotics Anaphylaxis     Social History     Socioeconomic History    Marital status:      Spouse name: Not on file    Number of children: Not on file    Years of education: Not on file    Highest education level: Not on file   Occupational History    Not on file   Tobacco Use    Smoking status: Never    Smokeless tobacco: Never   Vaping Use    Vaping Use: Never used   Substance and Sexual Activity    Alcohol use: Yes     Comment: rare    Drug use: No    Sexual activity: Not on file   Other Topics Concern    Not on file   Social History Narrative        SERUM SICKNESS DUE TO SULFA ALLERGIC REACTION---    THROMBOCYTOPENIA    ELEV BS    URETERAL CALCULI    L THUMB TORN TENDON AND OR WITH TITI--WORK    M--2---THIS 11 YRS--ONE DAUGHTER WITH HER    ONE DAUGHTER LIVES IN [place] WITH PATIENTS FIRST WIFE    One daughter lives here    ED    WIFE LPN NURSING HOME---[facility]EN [facility]---peds  [facility]    HTN 7-13    ANX WITH INTERCOURSE    WORKS FOR [employer]----[employer]---    LEFT URETERAL CALCULI 9-17 OR DR MY RUCKER---TOLD TO DRINK LEMONAIDE ADN    INC FLUIDS--TO SEE UROL 0CT 2020    IRBBB 9-17    BILAT ING AND UM HERNIA SEEN ON CT 9-17    Kidney stone lithtripsy    4-21 ----added  hctz-----caused  dec postasium 8-22 and poor erections    . Social Determinants of Health     Financial Resource Strain: Not on file   Food Insecurity: Not on file   Transportation Needs: Not on file   Physical Activity: Not on file   Stress: Not on file   Social Connections: Not on file   Intimate Partner Violence: Not on file   Housing Stability: Not on file      Past Medical History:   Diagnosis Date    Erectile dysfunction     Hyperlipidemia     diet controlled    Hypertension     Incomplete right bundle branch block     Inguinal hernia     Torn tendon     LEFT THUMB    Umbilical hernia     Ureteral calculi     For OR 4-26-21     No family history on file. Past Surgical History:   Procedure Laterality Date    LITHOTRIPSY Left 09/28/2017    CYSTOSCOPY;LEFT STENT    LITHOTRIPSY Left 4/26/2021    CYSTOSCOPY LEFT ESWL EXTRACORPOREAL SHOCK WAVE LITHOTRIPSY LEFT STENT performed by Drea Martini MD at [address] [place] [address] Dr:    08/15/22 0641   BP: 126/74   Temp: 98.5 °F (36.9 °C)   TempSrc: Oral   Weight: 238 lb (108 kg)   Height: 5' 10\" (1.778 m)       Objective:    Physical Exam  Vitals reviewed. Constitutional:       Appearance: Normal appearance. He is well-developed. HENT:      Head: Normocephalic.       Right Ear: Tympanic membrane normal.      Left Ear: Tympanic membrane normal.      Nose: Nose normal.      Mouth/Throat:      Mouth: Mucous membranes are moist.   Eyes:      Pupils: Pupils are equal, round, and reactive to light. Cardiovascular:      Rate and Rhythm: Normal rate and regular rhythm. Pulmonary:      Effort: Pulmonary effort is normal.      Breath sounds: Normal breath sounds. Abdominal:      General: Bowel sounds are normal.      Palpations: Abdomen is soft. Musculoskeletal:         General: Normal range of motion. Cervical back: Normal range of motion. Skin:     General: Skin is warm. Neurological:      Mental Status: He is alert and oriented to person, place, and time. Psychiatric:         Behavior: Behavior normal.       Nadine Muhammad was seen today for annual exam.    Diagnoses and all orders for this visit:    Encounter for annual general medical examination with abnormal findings in adult    Essential hypertension  -     Basic Metabolic Panel; Future  -     losartan (COZAAR) 25 MG tablet; TK 1 T PO QAM FOR BP  -     potassium chloride (KLOR-CON M) 10 MEQ extended release tablet; Take 1 tablet by mouth in the morning. Kidney stone  -     hydroCHLOROthiazide (HYDRODIURIL) 25 MG tablet; Take 1 tablet by mouth every morning    Other orders  -     sildenafil (REVATIO) 20 MG tablet; Take one to  five  daily prn      Comments:  add  pot     bmp in one mo----   viatgalejandrina  wane risk of med      I educated the patient about all medications. Make sure they were correct and educated  on the risk associated with missing meds or taking them incorrectly. A list of medications is being sent home with patient today. Check blood pressure at home twice a day. Low-salt low caffeine diet. Call if systolic blood pressure is above 042 and diastolic blood pressures above 85. Only use a upper arm digital cuff. Aggressive low-fat diet. Avoid red meats, greasy fried foods, dairy products. Avoid processed foods. Take cholesterol medications without food. I informed patient about the risk associated with noncompliance of medication and taking medications incorrectly.   Appropriate

## 2022-10-14 DIAGNOSIS — I10 ESSENTIAL HYPERTENSION: ICD-10-CM

## 2022-10-14 LAB
ANION GAP SERPL CALCULATED.3IONS-SCNC: 8 MMOL/L (ref 7–16)
BUN BLDV-MCNC: 17 MG/DL (ref 6–20)
CALCIUM SERPL-MCNC: 8.9 MG/DL (ref 8.6–10.2)
CHLORIDE BLD-SCNC: 98 MMOL/L (ref 98–107)
CO2: 32 MMOL/L (ref 22–29)
CREAT SERPL-MCNC: 1.2 MG/DL (ref 0.7–1.2)
GFR AFRICAN AMERICAN: >60
GFR NON-AFRICAN AMERICAN: >60 ML/MIN/1.73
GLUCOSE BLD-MCNC: 98 MG/DL (ref 74–99)
POTASSIUM SERPL-SCNC: 3 MMOL/L (ref 3.5–5)
SODIUM BLD-SCNC: 138 MMOL/L (ref 132–146)

## 2023-07-10 DIAGNOSIS — I10 ESSENTIAL HYPERTENSION: ICD-10-CM

## 2023-07-10 NOTE — TELEPHONE ENCOUNTER
----- Message from Riccardo Delarosa sent at 7/7/2023  8:42 AM EDT -----  Subject: Refill Request    QUESTIONS  Name of Medication? losartan (COZAAR) 25 MG tablet  Patient-reported dosage and instructions? TK 1 T KENYA العراقي FOR BP  How many days do you have left? 3  Preferred Pharmacy? [id number]  Pharmacy phone number (if available)? [phone]  ---------------------------------------------------------------------------  --------------  CALL BACK INFO  What is the best way for the office to contact you? OK to leave message on   voicemail  Preferred Call Back Phone Number? [phone]  ---------------------------------------------------------------------------  --------------  SCRIPT ANSWERS  Relationship to Patient?  Self

## 2023-07-11 RX ORDER — LOSARTAN POTASSIUM 25 MG/1
TABLET ORAL
Qty: 90 TABLET | Refills: 3 | Status: SHIPPED | OUTPATIENT
Start: 2023-07-11

## 2023-08-11 DIAGNOSIS — I10 ESSENTIAL HYPERTENSION: ICD-10-CM

## 2023-08-11 RX ORDER — LOSARTAN POTASSIUM 25 MG/1
TABLET ORAL
Qty: 90 TABLET | Refills: 3 | Status: SHIPPED | OUTPATIENT
Start: 2023-08-11

## 2023-08-17 DIAGNOSIS — Z00.01 ENCOUNTER FOR ANNUAL GENERAL MEDICAL EXAMINATION WITH ABNORMAL FINDINGS IN ADULT: ICD-10-CM

## 2023-08-17 DIAGNOSIS — E11.9 DIET-CONTROLLED DIABETES MELLITUS (HCC): ICD-10-CM

## 2023-08-17 DIAGNOSIS — E55.9 VITAMIN D DEFICIENCY: ICD-10-CM

## 2023-08-17 LAB
ABSOLUTE IMMATURE GRANULOCYTE: <0.03 K/UL (ref 0–0.58)
ALBUMIN SERPL-MCNC: 4.4 G/DL (ref 3.5–5.2)
ALP BLD-CCNC: 82 U/L (ref 40–129)
ALT SERPL-CCNC: 19 U/L (ref 0–40)
ANION GAP SERPL CALCULATED.3IONS-SCNC: 16 MMOL/L (ref 7–16)
AST SERPL-CCNC: 18 U/L (ref 0–39)
BASOPHILS ABSOLUTE: 0.02 K/UL (ref 0–0.2)
BASOPHILS RELATIVE PERCENT: 0 % (ref 0–2)
BILIRUB SERPL-MCNC: 0.6 MG/DL (ref 0–1.2)
BUN BLDV-MCNC: 19 MG/DL (ref 6–20)
CALCIUM SERPL-MCNC: 9.5 MG/DL (ref 8.6–10.2)
CHLORIDE BLD-SCNC: 100 MMOL/L (ref 98–107)
CHOLESTEROL: 238 MG/DL
CO2: 27 MMOL/L (ref 22–29)
CREAT SERPL-MCNC: 1.2 MG/DL (ref 0.7–1.2)
EOSINOPHILS ABSOLUTE: 0 K/UL (ref 0.05–0.5)
EOSINOPHILS RELATIVE PERCENT: 0 % (ref 0–6)
GFR SERPL CREATININE-BSD FRML MDRD: >60 ML/MIN/1.73M2
GLUCOSE BLD-MCNC: 99 MG/DL (ref 74–99)
HCT VFR BLD CALC: 46.8 % (ref 37–54)
HDLC SERPL-MCNC: 41 MG/DL
HEMOGLOBIN: 14.8 G/DL (ref 12.5–16.5)
IMMATURE GRANULOCYTES: 0 % (ref 0–5)
LDL CHOLESTEROL: 168 MG/DL
LYMPHOCYTES ABSOLUTE: 1.42 K/UL (ref 1.5–4)
LYMPHOCYTES RELATIVE PERCENT: 30 % (ref 20–42)
MCH RBC QN AUTO: 30.1 PG (ref 26–35)
MCHC RBC AUTO-ENTMCNC: 31.6 G/DL (ref 32–34.5)
MCV RBC AUTO: 95.3 FL (ref 80–99.9)
MONOCYTES ABSOLUTE: 0.51 K/UL (ref 0.1–0.95)
MONOCYTES RELATIVE PERCENT: 11 % (ref 2–12)
NEUTROPHILS ABSOLUTE: 2.7 K/UL (ref 1.8–7.3)
NEUTROPHILS RELATIVE PERCENT: 58 % (ref 43–80)
PDW BLD-RTO: 13.8 % (ref 11.5–15)
PLATELET # BLD: 220 K/UL (ref 130–450)
PMV BLD AUTO: 9.5 FL (ref 7–12)
POTASSIUM SERPL-SCNC: 3.7 MMOL/L (ref 3.5–5)
PROSTATE SPECIFIC ANTIGEN: 1.68 NG/ML (ref 0–4)
RBC # BLD: 4.91 M/UL (ref 3.8–5.8)
SODIUM BLD-SCNC: 143 MMOL/L (ref 132–146)
TOTAL PROTEIN: 6.9 G/DL (ref 6.4–8.3)
TRIGL SERPL-MCNC: 144 MG/DL
VITAMIN D 25-HYDROXY: 37.1 NG/ML (ref 30–100)
VLDLC SERPL CALC-MCNC: 29 MG/DL
WBC # BLD: 4.7 K/UL (ref 4.5–11.5)

## 2023-08-18 LAB — HBA1C MFR BLD: 5.5 % (ref 4–5.6)

## 2023-08-21 ENCOUNTER — OFFICE VISIT (OUTPATIENT)
Dept: PRIMARY CARE CLINIC | Age: 53
End: 2023-08-21
Payer: COMMERCIAL

## 2023-08-21 VITALS
TEMPERATURE: 99 F | DIASTOLIC BLOOD PRESSURE: 82 MMHG | WEIGHT: 250 LBS | SYSTOLIC BLOOD PRESSURE: 138 MMHG | BODY MASS INDEX: 35.79 KG/M2 | HEIGHT: 70 IN

## 2023-08-21 DIAGNOSIS — Z00.01 ENCOUNTER FOR ANNUAL GENERAL MEDICAL EXAMINATION WITH ABNORMAL FINDINGS IN ADULT: Primary | ICD-10-CM

## 2023-08-21 DIAGNOSIS — N20.0 KIDNEY STONE: ICD-10-CM

## 2023-08-21 DIAGNOSIS — Z12.11 SCREEN FOR COLON CANCER: ICD-10-CM

## 2023-08-21 DIAGNOSIS — I10 ESSENTIAL HYPERTENSION: ICD-10-CM

## 2023-08-21 DIAGNOSIS — E11.9 DIET-CONTROLLED DIABETES MELLITUS (HCC): ICD-10-CM

## 2023-08-21 DIAGNOSIS — E66.01 SEVERE OBESITY (BMI 35.0-39.9) WITH COMORBIDITY (HCC): ICD-10-CM

## 2023-08-21 PROCEDURE — 3079F DIAST BP 80-89 MM HG: CPT | Performed by: FAMILY MEDICINE

## 2023-08-21 PROCEDURE — 3075F SYST BP GE 130 - 139MM HG: CPT | Performed by: FAMILY MEDICINE

## 2023-08-21 PROCEDURE — 99396 PREV VISIT EST AGE 40-64: CPT | Performed by: FAMILY MEDICINE

## 2023-08-21 RX ORDER — HYDROCHLOROTHIAZIDE 25 MG/1
25 TABLET ORAL EVERY MORNING
Qty: 90 TABLET | Refills: 1
Start: 2023-08-21

## 2023-08-21 RX ORDER — POTASSIUM CITRATE 15 MEQ/1
15 TABLET, EXTENDED RELEASE ORAL 2 TIMES DAILY WITH MEALS
Qty: 180 TABLET | Refills: 5
Start: 2023-08-21

## 2023-08-21 SDOH — ECONOMIC STABILITY: HOUSING INSECURITY
IN THE LAST 12 MONTHS, WAS THERE A TIME WHEN YOU DID NOT HAVE A STEADY PLACE TO SLEEP OR SLEPT IN A SHELTER (INCLUDING NOW)?: NO

## 2023-08-21 SDOH — ECONOMIC STABILITY: INCOME INSECURITY: HOW HARD IS IT FOR YOU TO PAY FOR THE VERY BASICS LIKE FOOD, HOUSING, MEDICAL CARE, AND HEATING?: NOT HARD AT ALL

## 2023-08-21 SDOH — ECONOMIC STABILITY: FOOD INSECURITY: WITHIN THE PAST 12 MONTHS, YOU WORRIED THAT YOUR FOOD WOULD RUN OUT BEFORE YOU GOT MONEY TO BUY MORE.: NEVER TRUE

## 2023-08-21 SDOH — ECONOMIC STABILITY: FOOD INSECURITY: WITHIN THE PAST 12 MONTHS, THE FOOD YOU BOUGHT JUST DIDN'T LAST AND YOU DIDN'T HAVE MONEY TO GET MORE.: NEVER TRUE

## 2023-08-21 ASSESSMENT — PATIENT HEALTH QUESTIONNAIRE - PHQ9
SUM OF ALL RESPONSES TO PHQ9 QUESTIONS 1 & 2: 0
SUM OF ALL RESPONSES TO PHQ QUESTIONS 1-9: 0
2. FEELING DOWN, DEPRESSED OR HOPELESS: 0
1. LITTLE INTEREST OR PLEASURE IN DOING THINGS: 0
SUM OF ALL RESPONSES TO PHQ QUESTIONS 1-9: 0

## 2023-08-21 ASSESSMENT — ENCOUNTER SYMPTOMS
RESPIRATORY NEGATIVE: 1
GASTROINTESTINAL NEGATIVE: 1
EYES NEGATIVE: 1
ALLERGIC/IMMUNOLOGIC NEGATIVE: 1

## 2023-08-21 NOTE — PROGRESS NOTES
23  Name: Elli Stuart    : 1970    Sex: male    Age: 47 y.o. Subjective:  Chief Complaint: Patient is here for   yearly wellens  ck     bp chol potassiu  kidney stones  erections    Feel ok    no cp or sob        Hates working    [employer] urol   in few weeks  Chol  inc    238  Up  12 lbs--steel toe shoes      Review of Systems   Constitutional: Negative. HENT: Negative. Eyes: Negative. Respiratory: Negative. Cardiovascular: Negative. Gastrointestinal: Negative. Endocrine: Negative. Genitourinary: Negative. Musculoskeletal: Negative. Skin: Negative. Allergic/Immunologic: Negative. Neurological: Negative. Hematological: Negative. Psychiatric/Behavioral: Negative.          Current Outpatient Medications:     Potassium Citrate ER (UROCIT-K) 15 MEQ (1620 MG) TBCR extended release tablet, Take 1 tablet by mouth 2 times daily (with meals), Disp: 180 tablet, Rfl: 5    hydroCHLOROthiazide (HYDRODIURIL) 25 MG tablet, Take 1 tablet by mouth every morning, Disp: 90 tablet, Rfl: 1    losartan (COZAAR) 25 MG tablet, TK 1 T PO QAM FOR BP, Disp: 90 tablet, Rfl: 3    sildenafil (REVATIO) 20 MG tablet, Take one to  five  daily prn, Disp: 30 tablet, Rfl: 12  Allergies   Allergen Reactions    Pcn [Penicillins] Anaphylaxis    Sulfa Antibiotics Anaphylaxis     Social History     Socioeconomic History    Marital status:      Spouse name: Not on file    Number of children: Not on file    Years of education: Not on file    Highest education level: Not on file   Occupational History    Not on file   Tobacco Use    Smoking status: Never    Smokeless tobacco: Never   Vaping Use    Vaping Use: Never used   Substance and Sexual Activity    Alcohol use: Yes     Comment: rare    Drug use: No    Sexual activity: Not on file   Other Topics Concern    Not on file   Social History Narrative        SERUM SICKNESS DUE TO SULFA ALLERGIC REACTION---    THROMBOCYTOPENIA    ELEV BS

## 2023-09-14 LAB — NONINV COLON CA DNA+OCC BLD SCRN STL QL: NEGATIVE

## 2023-09-18 ENCOUNTER — TELEPHONE (OUTPATIENT)
Dept: PRIMARY CARE CLINIC | Age: 53
End: 2023-09-18

## 2024-08-05 DIAGNOSIS — I10 ESSENTIAL HYPERTENSION: ICD-10-CM

## 2024-08-05 RX ORDER — LOSARTAN POTASSIUM 25 MG/1
TABLET ORAL
Qty: 90 TABLET | Refills: 3 | Status: SHIPPED | OUTPATIENT
Start: 2024-08-05

## 2024-08-05 NOTE — TELEPHONE ENCOUNTER
Patients last appointment 8/21/2023.  Patients next scheduled appointment   Future Appointments   Date Time Provider Department Center   8/23/2024  6:45 AM Ajay Valdovinos DO [facility] DEP

## 2024-08-21 ENCOUNTER — HOSPITAL ENCOUNTER (OUTPATIENT)
Age: 54
Discharge: HOME | End: 2024-08-21
Payer: COMMERCIAL

## 2024-08-21 DIAGNOSIS — Z00.01 ENCOUNTER FOR ANNUAL GENERAL MEDICAL EXAMINATION WITH ABNORMAL FINDINGS IN ADULT: ICD-10-CM

## 2024-08-21 LAB
ALBUMIN SERPL-MCNC: 4.2 G/DL (ref 3.5–5.2)
ALP SERPL-CCNC: 93 U/L (ref 40–129)
ALT SERPL-CCNC: 17 U/L (ref 0–40)
ANION GAP SERPL CALCULATED.3IONS-SCNC: 9 MMOL/L (ref 7–16)
AST SERPL-CCNC: 17 U/L (ref 0–39)
BASOPHILS # BLD: 0.03 K/UL (ref 0–0.2)
BASOPHILS NFR BLD: 1 % (ref 0–2)
BILIRUB SERPL-MCNC: 0.6 MG/DL (ref 0–1.2)
BUN SERPL-MCNC: 18 MG/DL (ref 6–20)
CALCIUM SERPL-MCNC: 9.3 MG/DL (ref 8.6–10.2)
CHLORIDE SERPL-SCNC: 98 MMOL/L (ref 98–107)
CHOLEST SERPL-MCNC: 212 MG/DL
CO2 SERPL-SCNC: 31 MMOL/L (ref 22–29)
CREAT SERPL-MCNC: 1.2 MG/DL (ref 0.7–1.2)
EOSINOPHIL # BLD: 0.16 K/UL (ref 0.05–0.5)
EOSINOPHILS RELATIVE PERCENT: 3 % (ref 0–6)
ERYTHROCYTE [DISTWIDTH] IN BLOOD BY AUTOMATED COUNT: 12.6 % (ref 11.5–15)
GFR, ESTIMATED: 70 ML/MIN/1.73M2
GLUCOSE SERPL-MCNC: 102 MG/DL (ref 74–99)
HCT VFR BLD AUTO: 43.3 % (ref 37–54)
HDLC SERPL-MCNC: 39 MG/DL
HGB BLD-MCNC: 14.8 G/DL (ref 12.5–16.5)
IMM GRANULOCYTES # BLD AUTO: 0.03 K/UL (ref 0–0.58)
IMM GRANULOCYTES NFR BLD: 1 % (ref 0–5)
LDLC SERPL CALC-MCNC: 130 MG/DL
LYMPHOCYTES NFR BLD: 1.6 K/UL (ref 1.5–4)
LYMPHOCYTES RELATIVE PERCENT: 26 % (ref 20–42)
MCH RBC QN AUTO: 30.4 PG (ref 26–35)
MCHC RBC AUTO-ENTMCNC: 34.2 G/DL (ref 32–34.5)
MCV RBC AUTO: 88.9 FL (ref 80–99.9)
MONOCYTES NFR BLD: 0.57 K/UL (ref 0.1–0.95)
MONOCYTES NFR BLD: 9 % (ref 2–12)
NEUTROPHILS NFR BLD: 61 % (ref 43–80)
NEUTS SEG NFR BLD: 3.69 K/UL (ref 1.8–7.3)
PLATELET # BLD AUTO: 215 K/UL (ref 130–450)
PMV BLD AUTO: 9.5 FL (ref 7–12)
POTASSIUM SERPL-SCNC: 3.2 MMOL/L (ref 3.5–5)
PROT SERPL-MCNC: 6.9 G/DL (ref 6.4–8.3)
PSA SERPL-MCNC: 1.93 NG/ML (ref 0–4)
RBC # BLD AUTO: 4.87 M/UL (ref 3.8–5.8)
SODIUM SERPL-SCNC: 138 MMOL/L (ref 132–146)
TRIGL SERPL-MCNC: 216 MG/DL
VLDLC SERPL CALC-MCNC: 43 MG/DL
WBC OTHER # BLD: 6.1 K/UL (ref 4.5–11.5)

## 2024-08-21 PROCEDURE — G0103 PSA SCREENING: HCPCS

## 2024-08-21 PROCEDURE — 85025 COMPLETE CBC W/AUTO DIFF WBC: CPT

## 2024-08-21 PROCEDURE — 80053 COMPREHEN METABOLIC PANEL: CPT

## 2024-08-21 PROCEDURE — 80061 LIPID PANEL: CPT

## 2024-08-21 PROCEDURE — 36415 COLL VENOUS BLD VENIPUNCTURE: CPT

## 2024-08-23 ENCOUNTER — OFFICE VISIT (OUTPATIENT)
Dept: PRIMARY CARE CLINIC | Age: 54
End: 2024-08-23
Payer: COMMERCIAL

## 2024-08-23 VITALS
OXYGEN SATURATION: 95 % | WEIGHT: 244 LBS | DIASTOLIC BLOOD PRESSURE: 82 MMHG | SYSTOLIC BLOOD PRESSURE: 132 MMHG | TEMPERATURE: 98.7 F | HEART RATE: 74 BPM | HEIGHT: 70 IN | BODY MASS INDEX: 34.93 KG/M2

## 2024-08-23 DIAGNOSIS — E87.6 HYPOKALEMIA: ICD-10-CM

## 2024-08-23 DIAGNOSIS — Z00.01 ENCOUNTER FOR ANNUAL GENERAL MEDICAL EXAMINATION WITH ABNORMAL FINDINGS IN ADULT: Primary | ICD-10-CM

## 2024-08-23 DIAGNOSIS — R73.03 PRE-DIABETES: ICD-10-CM

## 2024-08-23 DIAGNOSIS — E78.2 MIXED HYPERLIPIDEMIA: Chronic | ICD-10-CM

## 2024-08-23 DIAGNOSIS — N20.0 KIDNEY STONE: ICD-10-CM

## 2024-08-23 DIAGNOSIS — I10 ESSENTIAL HYPERTENSION: ICD-10-CM

## 2024-08-23 DIAGNOSIS — E55.9 VITAMIN D DEFICIENCY: ICD-10-CM

## 2024-08-23 PROBLEM — E11.9 DIET-CONTROLLED DIABETES MELLITUS (HCC): Status: RESOLVED | Noted: 2023-08-21 | Resolved: 2024-08-23

## 2024-08-23 PROCEDURE — 3075F SYST BP GE 130 - 139MM HG: CPT | Performed by: FAMILY MEDICINE

## 2024-08-23 PROCEDURE — 99396 PREV VISIT EST AGE 40-64: CPT | Performed by: FAMILY MEDICINE

## 2024-08-23 PROCEDURE — 3079F DIAST BP 80-89 MM HG: CPT | Performed by: FAMILY MEDICINE

## 2024-08-23 RX ORDER — SILDENAFIL CITRATE 20 MG/1
TABLET ORAL
Qty: 30 TABLET | Refills: 12 | Status: SHIPPED | OUTPATIENT
Start: 2024-08-23

## 2024-08-23 RX ORDER — HYDROCHLOROTHIAZIDE 25 MG/1
25 TABLET ORAL EVERY MORNING
Qty: 90 TABLET | Refills: 1 | Status: SHIPPED | OUTPATIENT
Start: 2024-08-23

## 2024-08-23 RX ORDER — POTASSIUM CITRATE 15 MEQ/1
15 TABLET, EXTENDED RELEASE ORAL 2 TIMES DAILY WITH MEALS
Qty: 180 TABLET | Refills: 5 | Status: SHIPPED | OUTPATIENT
Start: 2024-08-23

## 2024-08-23 RX ORDER — LOSARTAN POTASSIUM 25 MG/1
TABLET ORAL
Qty: 90 TABLET | Refills: 3 | Status: SHIPPED | OUTPATIENT
Start: 2024-08-23

## 2024-08-23 RX ORDER — POTASSIUM CHLORIDE 750 MG/1
10 TABLET, EXTENDED RELEASE ORAL DAILY
Qty: 90 TABLET | Refills: 5 | Status: SHIPPED | OUTPATIENT
Start: 2024-08-23

## 2024-08-23 SDOH — ECONOMIC STABILITY: FOOD INSECURITY: WITHIN THE PAST 12 MONTHS, THE FOOD YOU BOUGHT JUST DIDN'T LAST AND YOU DIDN'T HAVE MONEY TO GET MORE.: NEVER TRUE

## 2024-08-23 SDOH — ECONOMIC STABILITY: FOOD INSECURITY: WITHIN THE PAST 12 MONTHS, YOU WORRIED THAT YOUR FOOD WOULD RUN OUT BEFORE YOU GOT MONEY TO BUY MORE.: NEVER TRUE

## 2024-08-23 SDOH — ECONOMIC STABILITY: INCOME INSECURITY: HOW HARD IS IT FOR YOU TO PAY FOR THE VERY BASICS LIKE FOOD, HOUSING, MEDICAL CARE, AND HEATING?: NOT HARD AT ALL

## 2024-08-23 ASSESSMENT — ENCOUNTER SYMPTOMS
ALLERGIC/IMMUNOLOGIC NEGATIVE: 1
RESPIRATORY NEGATIVE: 1
GASTROINTESTINAL NEGATIVE: 1
EYES NEGATIVE: 1

## 2024-08-23 ASSESSMENT — PATIENT HEALTH QUESTIONNAIRE - PHQ9
SUM OF ALL RESPONSES TO PHQ QUESTIONS 1-9: 0
SUM OF ALL RESPONSES TO PHQ9 QUESTIONS 1 & 2: 0
2. FEELING DOWN, DEPRESSED OR HOPELESS: NOT AT ALL
1. LITTLE INTEREST OR PLEASURE IN DOING THINGS: NOT AT ALL
SUM OF ALL RESPONSES TO PHQ QUESTIONS 1-9: 0

## 2024-11-27 DIAGNOSIS — I10 ESSENTIAL HYPERTENSION: ICD-10-CM

## 2024-11-27 LAB
ALBUMIN: 4.1 G/DL (ref 3.5–5.2)
ALP BLD-CCNC: 97 U/L (ref 40–129)
ALT SERPL-CCNC: 18 U/L (ref 0–40)
ANION GAP SERPL CALCULATED.3IONS-SCNC: 12 MMOL/L (ref 7–16)
AST SERPL-CCNC: 19 U/L (ref 0–39)
BILIRUB SERPL-MCNC: 0.4 MG/DL (ref 0–1.2)
BUN BLDV-MCNC: 18 MG/DL (ref 6–20)
CALCIUM SERPL-MCNC: 9.4 MG/DL (ref 8.6–10.2)
CHLORIDE BLD-SCNC: 100 MMOL/L (ref 98–107)
CO2: 28 MMOL/L (ref 22–29)
CREAT SERPL-MCNC: 1.2 MG/DL (ref 0.7–1.2)
GFR, ESTIMATED: 69 ML/MIN/1.73M2
GLUCOSE BLD-MCNC: 113 MG/DL (ref 74–99)
POTASSIUM SERPL-SCNC: 3.8 MMOL/L (ref 3.5–5)
SODIUM BLD-SCNC: 140 MMOL/L (ref 132–146)
TOTAL PROTEIN: 6.7 G/DL (ref 6.4–8.3)

## 2024-12-01 NOTE — RESULT ENCOUNTER NOTE
Notify patient potassium is now good at 3.8.  Sugars up a little bit.  He may not have been fasting.  Keep her regular appoint

## 2025-03-01 DIAGNOSIS — N20.0 KIDNEY STONE: ICD-10-CM

## 2025-03-02 RX ORDER — HYDROCHLOROTHIAZIDE 25 MG/1
25 TABLET ORAL EVERY MORNING
Qty: 90 TABLET | Refills: 1 | Status: SHIPPED | OUTPATIENT
Start: 2025-03-02

## 2025-03-28 ENCOUNTER — OFFICE VISIT (OUTPATIENT)
Dept: PRIMARY CARE CLINIC | Age: 55
End: 2025-03-28

## 2025-03-28 VITALS
RESPIRATION RATE: 18 BRPM | WEIGHT: 243 LBS | BODY MASS INDEX: 34.87 KG/M2 | TEMPERATURE: 97.3 F | HEART RATE: 75 BPM | DIASTOLIC BLOOD PRESSURE: 82 MMHG | OXYGEN SATURATION: 97 % | SYSTOLIC BLOOD PRESSURE: 134 MMHG

## 2025-03-28 DIAGNOSIS — I10 ESSENTIAL HYPERTENSION: Chronic | ICD-10-CM

## 2025-03-28 DIAGNOSIS — K42.9 UMBILICAL HERNIA WITHOUT OBSTRUCTION AND WITHOUT GANGRENE: Primary | ICD-10-CM

## 2025-03-28 ASSESSMENT — PATIENT HEALTH QUESTIONNAIRE - PHQ9
1. LITTLE INTEREST OR PLEASURE IN DOING THINGS: NOT AT ALL
SUM OF ALL RESPONSES TO PHQ QUESTIONS 1-9: 0
2. FEELING DOWN, DEPRESSED OR HOPELESS: NOT AT ALL
SUM OF ALL RESPONSES TO PHQ QUESTIONS 1-9: 0

## 2025-03-28 NOTE — PROGRESS NOTES
Eduin Vela (:  1970) is a 54 y.o. male,    here for evaluation of the following chief complaint(s):  Other (Hernia x3)            Subjective   History of Present Illness  The patient presents for evaluation of an umbilical hernia.    A progressive increase in the size of the umbilical hernia is reported, now palpable during coughing episodes. The hernia was initially identified on a CAT scan in 2017. Despite the diagnosis, no surgical intervention has been undertaken. Additionally, bilateral inguinal hernias were noted on the same CAT scan. There is no current surgeon selected for potential surgical repair.    Review of Systems:  Constitutional:  No fever, no fatigue, no chills, no headaches, no weight change  Dermatology:  No rash, no mole, no dry or sensitive skin  ENT:  No cough, no sore throat, no sinus pain, no runny nose, no ear pain  Cardiology:  No chest pain, no palpitations, no leg edema, no shortness of breath, no PND  Gastroenterology:  No dysphagia, no abdominal pain, no nausea, no vomiting, no constipation, no diarrhea, no heartburn  Musculoskeletal:  No joint pain, no leg cramps, no back pain, no muscle aches  Respiratory:  No shortness of breath, no orthopnea, no wheezing, no HILL, no hemoptysis  Urology:  No blood in the urine, no urinary frequency, no urinary incontinence, no urinary urgency, no nocturia, no dysuria           Current Outpatient Medications:     hydroCHLOROthiazide (HYDRODIURIL) 25 MG tablet, Take 1 tablet by mouth every morning, Disp: 90 tablet, Rfl: 1    losartan (COZAAR) 25 MG tablet, TK 1 T PO QAM FOR BP, Disp: 90 tablet, Rfl: 3    Potassium Citrate ER (UROCIT-K) 15 MEQ (1620 MG) TBCR extended release tablet, Take 1 tablet by mouth 2 times daily (with meals), Disp: 180 tablet, Rfl: 5    sildenafil (REVATIO) 20 MG tablet, Take one to  five  daily prn, Disp: 30 tablet, Rfl: 12    potassium chloride (KLOR-CON M) 10 MEQ extended release tablet, Take 1 tablet by

## 2025-08-03 NOTE — PROGRESS NOTES
Problem: Chronic Conditions and Co-morbidities  Goal: Patient's chronic conditions and co-morbidity symptoms are monitored and maintained or improved  8/2/2025 2050 by Ana Acosta, RN  Outcome: Completed  8/2/2025 0923 by Lu Briseno, RN  Outcome: Progressing     Problem: Discharge Planning  Goal: Discharge to home or other facility with appropriate resources  Outcome: Completed     Problem: Safety - Adult  Goal: Free from fall injury  8/2/2025 2050 by Ana Acosta, RN  Outcome: Completed  8/2/2025 0923 by Lu Briseno, RN  Outcome: Progressing      potassium chloride (KLOR-CON M) 10 MEQ extended release tablet; Take 1 tablet by mouth daily  -     Comprehensive Metabolic Panel; Future    Mixed hyperlipidemia    Hypokalemia        Comments: diet exer  add potaium   cmp   3 mo and call after  diet exer      I educated the patient about all medications.  Make sure they were correct and educated  on the risk associated with missing meds or taking them incorrectly.  A list of medications is being sent home with patient today.      Check blood pressure at home twice a day.  Low-salt low caffeine diet.  Call if systolic blood pressure is above 150 and diastolic blood pressures above 85.  Only use a upper arm digital cuff.      Aggressive low-fat diet.  Avoid red meats, greasy fried foods, dairy products.  Avoid processed foods.  Take cholesterol medications without food.    A great deal of time spent reviewing medications, diet, exercise, social issues. Also reviewing the chart before entering the room with patient and finishing charting after leaving patient's room. More than half of that time was spent face to face with the patient in counseling and coordinating care.  I informed patient about the risk associated with noncompliance of medication and taking medications incorrectly.  Appropriate follow-up with myself and all specialist.  Encourage family members to take active role in assisting with medications and medical care.  If any confusion should develop to notify my office immediately to avoid risk of worsening medical condition      Follow Up: No follow-ups on file.     Seen by:  Ajay Valdovinos DO

## 2025-09-02 DIAGNOSIS — I10 ESSENTIAL HYPERTENSION: ICD-10-CM

## 2025-09-02 RX ORDER — LOSARTAN POTASSIUM 25 MG/1
TABLET ORAL
Qty: 90 TABLET | Refills: 3 | Status: SHIPPED | OUTPATIENT
Start: 2025-09-02

## (undated) DEVICE — LENS CYSTOSCOPE 30 DEG

## (undated) DEVICE — GLOVE ORANGE PI 7 1/2   MSG9075

## (undated) DEVICE — SET CYSTOSCOPE 21FR

## (undated) DEVICE — GRADUATE

## (undated) DEVICE — CATHETER URET 5FR L70CM OPN END SGL LUMN INJ HUB FLEXIMA

## (undated) DEVICE — MEDIA CONTRAST ISOVUE  300 10X50ML

## (undated) DEVICE — GOWN,SIRUS,FABRNF,XL,20/CS: Brand: MEDLINE

## (undated) DEVICE — SET SURG BASIN MAYO REUSABLE

## (undated) DEVICE — GLOVE SURG PROTEGRITY SLK LTX PF 7.5

## (undated) DEVICE — CYSTO PACK: Brand: MEDLINE INDUSTRIES, INC.

## (undated) DEVICE — SOLUTION SURG PREP ANTIMICROBIAL 4 OZ SKIN WND EXIDINE

## (undated) DEVICE — 4-PORT MANIFOLD: Brand: NEPTUNE 2

## (undated) DEVICE — GLOVE ORANGE PI 7   MSG9070

## (undated) DEVICE — GUIDEWIRE ENDOSCP L150CM DIA0.035IN TIP L15CM BENT PTFE

## (undated) DEVICE — GAUZE,SPONGE,4"X4",8PLY,STRL,LF,10/TRAY: Brand: MEDLINE

## (undated) DEVICE — BAG DRAINAGE CYSTO

## (undated) DEVICE — SOLUTION IV IRRIG WATER 1000ML POUR BRL 2F7114